# Patient Record
Sex: FEMALE | Race: WHITE | NOT HISPANIC OR LATINO | Employment: OTHER | RURAL
[De-identification: names, ages, dates, MRNs, and addresses within clinical notes are randomized per-mention and may not be internally consistent; named-entity substitution may affect disease eponyms.]

---

## 2021-06-24 ENCOUNTER — OFFICE VISIT (OUTPATIENT)
Dept: FAMILY MEDICINE CLINIC | Facility: CLINIC | Age: 66
End: 2021-06-24

## 2021-06-24 VITALS
HEIGHT: 67 IN | OXYGEN SATURATION: 98 % | SYSTOLIC BLOOD PRESSURE: 128 MMHG | WEIGHT: 153 LBS | HEART RATE: 129 BPM | TEMPERATURE: 97.2 F | BODY MASS INDEX: 24.01 KG/M2 | DIASTOLIC BLOOD PRESSURE: 78 MMHG | RESPIRATION RATE: 18 BRPM

## 2021-06-24 DIAGNOSIS — R53.83 FATIGUE, UNSPECIFIED TYPE: ICD-10-CM

## 2021-06-24 DIAGNOSIS — M79.671 RIGHT FOOT PAIN: ICD-10-CM

## 2021-06-24 DIAGNOSIS — J30.1 NON-SEASONAL ALLERGIC RHINITIS DUE TO POLLEN: Primary | ICD-10-CM

## 2021-06-24 DIAGNOSIS — Z13.220 SCREENING FOR HYPERLIPIDEMIA: ICD-10-CM

## 2021-06-24 DIAGNOSIS — K21.9 GASTROESOPHAGEAL REFLUX DISEASE WITHOUT ESOPHAGITIS: ICD-10-CM

## 2021-06-24 PROBLEM — F51.04 CHRONIC INSOMNIA: Status: ACTIVE | Noted: 2018-03-08

## 2021-06-24 PROCEDURE — 99202 OFFICE O/P NEW SF 15 MIN: CPT | Performed by: FAMILY MEDICINE

## 2021-06-24 RX ORDER — SODIUM PHOSPHATE,MONO-DIBASIC 19G-7G/118
1 ENEMA (ML) RECTAL DAILY
COMMUNITY
End: 2022-07-01

## 2021-06-24 RX ORDER — CETIRIZINE HYDROCHLORIDE 10 MG/1
1 CAPSULE, LIQUID FILLED ORAL DAILY
COMMUNITY

## 2021-06-24 RX ORDER — TRAZODONE HYDROCHLORIDE 50 MG/1
50 TABLET ORAL NIGHTLY PRN
COMMUNITY
End: 2022-07-01

## 2021-06-24 RX ORDER — OMEPRAZOLE 20 MG/1
1 CAPSULE, DELAYED RELEASE ORAL
COMMUNITY
End: 2021-07-08

## 2021-06-24 NOTE — PROGRESS NOTES
Subjective   Ana Sandoval is a 66 y.o. female.     Chief Complaint   Patient presents with   • Foot Pain     right foot    • Heartburn   • Allergies       Foot Injury   The incident occurred more than 1 week ago. There was no injury mechanism. The pain is present in the right foot. The quality of the pain is described as aching. The pain is moderate. The pain has been fluctuating since onset. She reports no foreign bodies present.   Heartburn  She reports no abdominal pain, no chest pain, no coughing or no nausea. This is a chronic problem. She has tried nothing for the symptoms. The treatment provided no relief.      Heart burn is very occasional and secondary to spicy or greasy foot.     Right foot: tender laterally. Hurts on occasion. Nothing makes it better. Bothers her if it hits something.     I personally reviewed and updated the patient's allergies, medications, problem list, and past medical, surgical, social, and family history. I have reviewed and confirmed the accuracy of the History of Present Illness and Review of Symptoms as documented by the MA/LPN/RN. May Encinas MD    Allergies:  Allergies   Allergen Reactions   • Penicillins Rash and Hives   • Tetracycline Hives   • Erythromycin Rash       Social History:  Social History     Socioeconomic History   • Marital status:      Spouse name: Not on file   • Number of children: Not on file   • Years of education: Not on file   • Highest education level: Not on file       Family History:  Family History   Problem Relation Age of Onset   • Heart disease Father    • Kidney disease Father    • Heart disease Mother        Past Medical History :  Patient Active Problem List   Diagnosis   • Acne rosacea   • Allergic rhinitis   • Chronic insomnia   • Menopause   • Vitamin D deficiency   • Right foot pain   • Gastroesophageal reflux disease without esophagitis       Medication List:    Current Outpatient Medications:   •  Calcium-Vitamin D 600-200  "MG-UNIT per tablet, Take  by mouth., Disp: , Rfl:   •  Cetirizine HCl (ZyrTEC Allergy) 10 MG capsule, Take 1 capsule by mouth Daily., Disp: , Rfl:   •  diphenhydrAMINE HCl (BENADRYL ALLERGY PO), Take  by mouth., Disp: , Rfl:   •  glucosamine-chondroitin 500-400 MG capsule capsule, Take 1 capsule by mouth Daily., Disp: , Rfl:   •  omeprazole (priLOSEC) 20 MG capsule, Take 1 tablet by mouth., Disp: , Rfl:   •  traZODone (DESYREL) 50 MG tablet, Take 50 mg by mouth At Night As Needed for Sleep., Disp: , Rfl:     Past Surgical History:  Past Surgical History:   Procedure Laterality Date   • CHOLECYSTECTOMY  2003   • FINGER SURGERY  2019    nodule on left pointer finger       Review of Systems:  Review of Systems   Constitutional: Negative for activity change and fever.   HENT: Negative for ear pain, rhinorrhea, sinus pressure and voice change.    Eyes: Negative for visual disturbance.   Respiratory: Negative for cough and shortness of breath.    Cardiovascular: Negative for chest pain.   Gastrointestinal: Negative for abdominal pain, diarrhea, nausea and vomiting.   Endocrine: Negative for cold intolerance and heat intolerance.   Genitourinary: Negative for frequency and urgency.   Musculoskeletal: Negative for arthralgias.   Skin: Negative for rash.   Neurological: Negative for syncope.   Hematological: Does not bruise/bleed easily.   Psychiatric/Behavioral: Negative for depressed mood. The patient is not nervous/anxious.        Physical Exam:  Vital Signs:  Vital Signs:   /78 (BP Location: Right arm, Patient Position: Sitting, Cuff Size: Adult)   Pulse (!) 129   Temp 97.2 °F (36.2 °C)   Resp 18   Ht 170.2 cm (67\")   Wt 69.4 kg (153 lb)   SpO2 98%   BMI 23.96 kg/m²     Result Review :                Physical Exam  Vitals and nursing note reviewed.   Constitutional:       General: She is not in acute distress.     Appearance: She is well-developed. She is not diaphoretic.   HENT:      Head: Normocephalic and " atraumatic.      Right Ear: External ear normal.      Left Ear: External ear normal.      Nose: Nose normal.      Mouth/Throat:      Pharynx: No oropharyngeal exudate.   Eyes:      General: No scleral icterus.        Right eye: No discharge.         Left eye: No discharge.      Conjunctiva/sclera: Conjunctivae normal.      Pupils: Pupils are equal, round, and reactive to light.   Neck:      Thyroid: No thyromegaly.      Trachea: No tracheal deviation.   Cardiovascular:      Rate and Rhythm: Normal rate and regular rhythm.      Heart sounds: Normal heart sounds. No murmur heard.   No friction rub. No gallop.    Pulmonary:      Effort: Pulmonary effort is normal. No respiratory distress.      Breath sounds: Normal breath sounds. No stridor. No wheezing or rales.   Abdominal:      General: Bowel sounds are normal. There is no distension.      Palpations: Abdomen is soft. There is no mass.      Tenderness: There is no abdominal tenderness. There is no guarding or rebound.   Musculoskeletal:         General: No tenderness or deformity. Normal range of motion.      Cervical back: Normal range of motion and neck supple.      Right foot: Normal. Normal range of motion. No tenderness.   Lymphadenopathy:      Cervical: No cervical adenopathy.   Skin:     General: Skin is warm and dry.      Capillary Refill: Capillary refill takes less than 2 seconds.      Coloration: Skin is not pale.      Findings: No erythema or rash.   Neurological:      Mental Status: She is alert and oriented to person, place, and time.      Cranial Nerves: No cranial nerve deficit.      Sensory: No sensory deficit.      Motor: No tremor, atrophy or abnormal muscle tone.      Coordination: Coordination normal.      Gait: Gait normal.      Deep Tendon Reflexes: Reflexes are normal and symmetric. Reflexes normal.   Psychiatric:         Behavior: Behavior normal.         Thought Content: Thought content normal.         Cognition and Memory: Memory is not  impaired. She does not exhibit impaired recent memory or impaired remote memory.         Judgment: Judgment normal.         Assessment and Plan:  Problems Addressed this Visit        Allergies and Adverse Reactions    Allergic rhinitis - Primary       Gastrointestinal Abdominal     Gastroesophageal reflux disease without esophagitis     Rare use of PPI. She has it if she needs it.          Relevant Medications    omeprazole (priLOSEC) 20 MG capsule       Musculoskeletal and Injuries    Right foot pain     Exam negative. She says it is getting better           Other Visit Diagnoses     Fatigue, unspecified type        Relevant Orders    CBC & Differential    Comprehensive Metabolic Panel    TSH    Screening for hyperlipidemia        Relevant Orders    Lipid Panel With / Chol / HDL Ratio      Diagnoses       Codes Comments    Non-seasonal allergic rhinitis due to pollen    -  Primary ICD-10-CM: J30.1  ICD-9-CM: 477.0     Right foot pain     ICD-10-CM: M79.671  ICD-9-CM: 729.5     Gastroesophageal reflux disease without esophagitis     ICD-10-CM: K21.9  ICD-9-CM: 530.81     Fatigue, unspecified type     ICD-10-CM: R53.83  ICD-9-CM: 780.79     Screening for hyperlipidemia     ICD-10-CM: Z13.220  ICD-9-CM: V77.91            An After Visit Summary and PPPS were given to the patient.         I wore protective equipment throughout this patient encounter to include mask. Hand hygiene was performed before donning protective equipment and after removal when leaving the room.

## 2021-06-29 PROBLEM — Z12.11 SCREENING FOR COLON CANCER: Status: ACTIVE | Noted: 2021-06-29

## 2021-07-02 LAB
ALBUMIN SERPL-MCNC: 4.5 G/DL (ref 3.8–4.8)
ALBUMIN/GLOB SERPL: 1.7 {RATIO} (ref 1.2–2.2)
ALP SERPL-CCNC: 88 IU/L (ref 48–121)
ALT SERPL-CCNC: 20 IU/L (ref 0–32)
AST SERPL-CCNC: 24 IU/L (ref 0–40)
BASOPHILS # BLD AUTO: 0 X10E3/UL (ref 0–0.2)
BASOPHILS NFR BLD AUTO: 1 %
BILIRUB SERPL-MCNC: 0.3 MG/DL (ref 0–1.2)
BUN SERPL-MCNC: 15 MG/DL (ref 8–27)
BUN/CREAT SERPL: 15 (ref 12–28)
CALCIUM SERPL-MCNC: 9.4 MG/DL (ref 8.7–10.3)
CHLORIDE SERPL-SCNC: 107 MMOL/L (ref 96–106)
CHOLEST SERPL-MCNC: 202 MG/DL (ref 100–199)
CHOLEST/HDLC SERPL: 2.4 RATIO (ref 0–4.4)
CO2 SERPL-SCNC: 21 MMOL/L (ref 20–29)
CREAT SERPL-MCNC: 1.02 MG/DL (ref 0.57–1)
EOSINOPHIL # BLD AUTO: 0 X10E3/UL (ref 0–0.4)
EOSINOPHIL NFR BLD AUTO: 1 %
ERYTHROCYTE [DISTWIDTH] IN BLOOD BY AUTOMATED COUNT: 12.9 % (ref 11.7–15.4)
GLOBULIN SER CALC-MCNC: 2.6 G/DL (ref 1.5–4.5)
GLUCOSE SERPL-MCNC: 96 MG/DL (ref 65–99)
HCT VFR BLD AUTO: 44.6 % (ref 34–46.6)
HDLC SERPL-MCNC: 83 MG/DL
HGB BLD-MCNC: 15.2 G/DL (ref 11.1–15.9)
IMM GRANULOCYTES # BLD AUTO: 0 X10E3/UL (ref 0–0.1)
IMM GRANULOCYTES NFR BLD AUTO: 0 %
LDLC SERPL CALC-MCNC: 102 MG/DL (ref 0–99)
LYMPHOCYTES # BLD AUTO: 2.4 X10E3/UL (ref 0.7–3.1)
LYMPHOCYTES NFR BLD AUTO: 37 %
MCH RBC QN AUTO: 29.1 PG (ref 26.6–33)
MCHC RBC AUTO-ENTMCNC: 34.1 G/DL (ref 31.5–35.7)
MCV RBC AUTO: 85 FL (ref 79–97)
MONOCYTES # BLD AUTO: 0.4 X10E3/UL (ref 0.1–0.9)
MONOCYTES NFR BLD AUTO: 7 %
NEUTROPHILS # BLD AUTO: 3.5 X10E3/UL (ref 1.4–7)
NEUTROPHILS NFR BLD AUTO: 54 %
PLATELET # BLD AUTO: 327 X10E3/UL (ref 150–450)
POTASSIUM SERPL-SCNC: 4.5 MMOL/L (ref 3.5–5.2)
PROT SERPL-MCNC: 7.1 G/DL (ref 6–8.5)
RBC # BLD AUTO: 5.22 X10E6/UL (ref 3.77–5.28)
SODIUM SERPL-SCNC: 144 MMOL/L (ref 134–144)
TRIGL SERPL-MCNC: 100 MG/DL (ref 0–149)
TSH SERPL DL<=0.005 MIU/L-ACNC: 1.07 UIU/ML (ref 0.45–4.5)
VLDLC SERPL CALC-MCNC: 17 MG/DL (ref 5–40)
WBC # BLD AUTO: 6.4 X10E3/UL (ref 3.4–10.8)

## 2021-07-08 ENCOUNTER — OFFICE VISIT (OUTPATIENT)
Dept: FAMILY MEDICINE CLINIC | Facility: CLINIC | Age: 66
End: 2021-07-08

## 2021-07-08 VITALS
BODY MASS INDEX: 23.98 KG/M2 | HEIGHT: 67 IN | HEART RATE: 112 BPM | RESPIRATION RATE: 18 BRPM | DIASTOLIC BLOOD PRESSURE: 70 MMHG | TEMPERATURE: 97.8 F | OXYGEN SATURATION: 99 % | WEIGHT: 152.8 LBS | SYSTOLIC BLOOD PRESSURE: 124 MMHG

## 2021-07-08 DIAGNOSIS — Z12.11 SCREENING FOR COLON CANCER: Primary | ICD-10-CM

## 2021-07-08 DIAGNOSIS — M79.671 RIGHT FOOT PAIN: ICD-10-CM

## 2021-07-08 DIAGNOSIS — Z00.00 WELCOME TO MEDICARE PREVENTIVE VISIT: ICD-10-CM

## 2021-07-08 DIAGNOSIS — N95.8 OTHER SPECIFIED MENOPAUSAL AND PERIMENOPAUSAL DISORDERS: ICD-10-CM

## 2021-07-08 DIAGNOSIS — Z12.31 SCREENING MAMMOGRAM, ENCOUNTER FOR: ICD-10-CM

## 2021-07-08 PROCEDURE — 99213 OFFICE O/P EST LOW 20 MIN: CPT | Performed by: FAMILY MEDICINE

## 2021-07-08 RX ORDER — FAMOTIDINE 10 MG
10 TABLET ORAL NIGHTLY PRN
COMMUNITY

## 2021-07-08 NOTE — PROGRESS NOTES
The ABCs of the Annual Wellness Visit  Welcome to Medicare Visit    Chief Complaint   Patient presents with   • Welcome To Medicare       Subjective   History of Present Illness:  Ana Sandoval is a 66 y.o. female who presents for a  Welcome to Medicare Visit.    HEALTH RISK ASSESSMENT    Recent Hospitalizations:  No hospitalization(s) within the last year.    Current Medical Providers:  Patient Care Team:  May Encinas MD as PCP - General (Family Medicine)    Smoking Status:  Social History     Tobacco Use   Smoking Status Not on file       Alcohol Consumption:  Social History     Substance and Sexual Activity   Alcohol Use None       Depression Screen:   PHQ-2/PHQ-9 Depression Screening 7/8/2021   Little interest or pleasure in doing things 0   Feeling down, depressed, or hopeless 0   Trouble falling or staying asleep, or sleeping too much 1   Feeling tired or having little energy 1   Poor appetite or overeating 0   Feeling bad about yourself - or that you are a failure or have let yourself or your family down 1   Trouble concentrating on things, such as reading the newspaper or watching television 0   Moving or speaking so slowly that other people could have noticed. Or the opposite - being so fidgety or restless that you have been moving around a lot more than usual 0   Thoughts that you would be better off dead, or of hurting yourself in some way 0   Total Score 3   If you checked off any problems, how difficult have these problems made it for you to do your work, take care of things at home, or get along with other people? Not difficult at all       Fall Risk Screen:  STEADI Fall Risk Assessment was completed, and patient is at LOW risk for falls.Assessment completed on:7/8/2021    Health Habits and Functional and Cognitive Screening:  Functional & Cognitive Status 7/8/2021   Do you have difficulty preparing food and eating? No   Do you have difficulty bathing yourself, getting dressed or grooming  yourself? No   Do you have difficulty using the toilet? No   Do you have difficulty moving around from place to place? No   Do you have trouble with steps or getting out of a bed or a chair? No   Current Diet Well Balanced Diet   Dental Exam Up to date   Eye Exam Not up to date   Exercise (times per week) 2 times per week   Current Exercises Include House Cleaning   Do you need help using the phone?  No   Are you deaf or do you have serious difficulty hearing?  No   Do you need help with transportation? No   Do you need help shopping? No   Do you need help preparing meals?  No   Do you need help with housework?  No   Do you need help with laundry? No   Do you need help taking your medications? No   Do you need help managing money? No   Do you ever drive or ride in a car without wearing a seat belt? No   Have you felt unusual stress, anger or loneliness in the last month? No   Who do you live with? Spouse   If you need help, do you have trouble finding someone available to you? No   Have you been bothered in the last four weeks by sexual problems? No   Do you have difficulty concentrating, remembering or making decisions? No         Does the patient have evidence of cognitive impairment? No    Asprin use counseling:Does not need ASA (and currently is not on it)    Visual Acuity:    No exam data present    Age-appropriate Screening Schedule:  Refer to the list below for future screening recommendations based on patient's age, sex and/or medical conditions. Orders for these recommended tests are listed in the plan section. The patient has been provided with a written plan.    Health Maintenance   Topic Date Due   • DXA SCAN  Never done   • ZOSTER VACCINE (2 of 3) 09/20/2017   • TDAP/TD VACCINES (4 - Td or Tdap) 08/05/2021 (Originally 2/7/2021)   • INFLUENZA VACCINE  08/01/2021   • MAMMOGRAM  06/15/2022   • PAP SMEAR  03/17/2023          HPI    The following portions of the patient's history were reviewed and updated as  appropriate: allergies, current medications, past family history, past medical history, past social history, past surgical history and problem list.    Outpatient Medications Prior to Visit   Medication Sig Dispense Refill   • Calcium-Vitamin D 600-200 MG-UNIT per tablet Take  by mouth.     • Cetirizine HCl (ZyrTEC Allergy) 10 MG capsule Take 1 capsule by mouth Daily.     • diphenhydrAMINE HCl (BENADRYL ALLERGY PO) Take  by mouth.     • famotidine (PEPCID) 10 MG tablet Take 10 mg by mouth At Night As Needed for Heartburn.     • traZODone (DESYREL) 50 MG tablet Take 50 mg by mouth At Night As Needed for Sleep.     • glucosamine-chondroitin 500-400 MG capsule capsule Take 1 capsule by mouth Daily.     • omeprazole (priLOSEC) 20 MG capsule Take 1 tablet by mouth.       No facility-administered medications prior to visit.       Patient Active Problem List   Diagnosis   • Acne rosacea   • Allergic rhinitis   • Chronic insomnia   • Menopause   • Vitamin D deficiency   • Right foot pain   • Gastroesophageal reflux disease without esophagitis   • Screening for colon cancer   • Welcome to Medicare preventive visit   • Screening mammogram, encounter for       Advanced Care Planning:  ACP discussion was held with the patient during this visit. Patient has an advance directive (not in EMR), copy requested.      Review of Systems   Constitutional: Negative for activity change and fever.   HENT: Negative for ear pain, rhinorrhea, sinus pressure and voice change.    Eyes: Negative for visual disturbance.   Respiratory: Negative for cough and shortness of breath.    Cardiovascular: Negative for chest pain.   Gastrointestinal: Negative for abdominal pain, diarrhea, nausea and vomiting.   Endocrine: Negative for cold intolerance and heat intolerance.   Genitourinary: Negative for frequency and urgency.   Musculoskeletal: Negative for arthralgias.   Skin: Negative for rash.   Neurological: Negative for syncope.   Hematological: Does  "not bruise/bleed easily.   Psychiatric/Behavioral: Negative for depressed mood. The patient is not nervous/anxious.      Compared to one year ago, the patient feels her physical health is the same.  Compared to one year ago, the patient feels her mental health is the same.    Reviewed chart for potential of high risk medication in the elderly: yes  Reviewed chart for potential of harmful drug interactions in the elderly:yes    Objective         Vitals:    07/08/21 0844   BP: 124/70   Pulse: 112   Resp: 18   Temp: 97.8 °F (36.6 °C)   SpO2: 99%   Weight: 69.3 kg (152 lb 12.8 oz)   Height: 170.2 cm (67\")   PainSc: 0-No pain       Body mass index is 23.93 kg/m².  Discussed the patient's BMI with her. The BMI is in the acceptable range.    Physical Exam  Vitals reviewed. Exam conducted with a chaperone present.   Constitutional:       General: She is not in acute distress.     Appearance: She is well-developed. She is not diaphoretic.   HENT:      Head: Normocephalic and atraumatic.      Right Ear: Hearing and external ear normal.      Left Ear: Hearing and external ear normal.      Nose: Nose normal.      Mouth/Throat:      Pharynx: No oropharyngeal exudate.   Eyes:      General: No scleral icterus.        Right eye: No discharge.         Left eye: No discharge.      Conjunctiva/sclera: Conjunctivae normal.      Pupils: Pupils are equal, round, and reactive to light.   Neck:      Thyroid: No thyromegaly.      Trachea: No tracheal deviation.   Cardiovascular:      Rate and Rhythm: Normal rate and regular rhythm.      Heart sounds: Normal heart sounds. No murmur heard.   No friction rub. No gallop.    Pulmonary:      Effort: Pulmonary effort is normal. No respiratory distress.      Breath sounds: Normal breath sounds. No wheezing or rales.   Chest:      Breasts:         Right: No inverted nipple, mass, nipple discharge, skin change or tenderness.         Left: No inverted nipple, mass, nipple discharge, skin change or " tenderness.   Abdominal:      General: Bowel sounds are normal. There is no distension.      Palpations: Abdomen is soft. There is no mass.      Tenderness: There is no abdominal tenderness. There is no guarding or rebound.      Hernia: No hernia is present.   Genitourinary:     Labia:         Right: No rash or lesion.         Left: No rash or lesion.       Vagina: Normal.      Adnexa:         Right: No mass.          Left: No mass.        Rectum: Normal.   Musculoskeletal:         General: No tenderness or deformity. Normal range of motion.      Cervical back: Normal range of motion and neck supple.   Lymphadenopathy:      Cervical: No cervical adenopathy.   Skin:     General: Skin is warm and dry.      Capillary Refill: Capillary refill takes less than 2 seconds.      Findings: No erythema or rash.   Neurological:      Mental Status: She is alert and oriented to person, place, and time.      Cranial Nerves: No cranial nerve deficit.      Sensory: No sensory deficit.      Motor: No abnormal muscle tone.      Coordination: Coordination normal.      Deep Tendon Reflexes: Reflexes normal.   Psychiatric:         Behavior: Behavior normal.         Lab Results   Component Value Date    GLU 96 07/01/2021    CHLPL 202 (H) 07/01/2021    TRIG 100 07/01/2021    HDL 83 07/01/2021     (H) 07/01/2021    VLDL 17 07/01/2021          ECG 12 Lead    Date/Time: 7/15/2021 11:50 PM  Performed by: May Encinas MD  Authorized by: May Encinas MD   Comparison: not compared with previous ECG   Rhythm: sinus rhythm  Rate: normal  Conduction: conduction normal  ST Segments: ST segments normal  T Waves: T waves normal    Clinical impression: normal ECG            Assessment/Plan   Medicare Risks and Personalized Health Plan  CMS Preventative Services Quick Reference  Advance Directive Discussion  Breast Cancer/Mammogram Screening    The above risks/problems have been discussed with the patient.  Pertinent information has been  shared with the patient in the After Visit Summary.  Follow up plans and orders are seen below in the Assessment/Plan Section.    Diagnoses and all orders for this visit:    1. Screening for colon cancer (Primary)    2. Welcome to Medicare preventive visit    3. Screening mammogram, encounter for  -     Mammo Screening Digital Tomosynthesis Bilateral With CAD; Future    4. Other specified menopausal and perimenopausal disorders  -     DEXA Bone Density Axial; Future    5. Right foot pain  Assessment & Plan:  With a burn. Now and then laterally. She is going to try different shoes.         Medical Problems           Follow Up:  No follow-ups on file.     An After Visit Summary and PPPS were given to the patient.    Discussed wearing sunscreen, seatbelts. Avoidance or caution with alcohol. Safe sex practices discussed. Discussed screening as appropriate for age.        I wore protective equipment throughout this patient encounter to include mask. Hand hygiene was performed before donning protective equipment and after removal when leaving the room.

## 2021-07-19 ENCOUNTER — APPOINTMENT (OUTPATIENT)
Dept: BONE DENSITY | Facility: HOSPITAL | Age: 66
End: 2021-07-19

## 2021-07-19 ENCOUNTER — APPOINTMENT (OUTPATIENT)
Dept: MAMMOGRAPHY | Facility: HOSPITAL | Age: 66
End: 2021-07-19

## 2021-07-23 ENCOUNTER — HOSPITAL ENCOUNTER (OUTPATIENT)
Dept: BONE DENSITY | Facility: HOSPITAL | Age: 66
Discharge: HOME | End: 2021-07-23

## 2021-07-23 ENCOUNTER — HOSPITAL ENCOUNTER (OUTPATIENT)
Dept: MAMMOGRAPHY | Facility: HOSPITAL | Age: 66
Discharge: HOME | End: 2021-07-23

## 2021-07-23 DIAGNOSIS — N95.8 OTHER SPECIFIED MENOPAUSAL AND PERIMENOPAUSAL DISORDERS: ICD-10-CM

## 2021-07-23 DIAGNOSIS — Z12.31 SCREENING MAMMOGRAM, ENCOUNTER FOR: ICD-10-CM

## 2021-07-23 PROCEDURE — 77080 DXA BONE DENSITY AXIAL: CPT

## 2021-07-23 PROCEDURE — 77067 SCR MAMMO BI INCL CAD: CPT

## 2021-07-23 PROCEDURE — 77063 BREAST TOMOSYNTHESIS BI: CPT

## 2021-08-06 ENCOUNTER — TELEPHONE (OUTPATIENT)
Dept: FAMILY MEDICINE CLINIC | Facility: CLINIC | Age: 66
End: 2021-08-06

## 2021-08-06 NOTE — TELEPHONE ENCOUNTER
Called patient and did her breast cancer risk assessment   5 year risk: 1.7%    Demo: 2.0%   Lifetime risk:6.3%      Demo: 7.8%

## 2022-04-15 ENCOUNTER — TELEPHONE (OUTPATIENT)
Dept: FAMILY MEDICINE CLINIC | Facility: CLINIC | Age: 67
End: 2022-04-15

## 2022-04-15 NOTE — TELEPHONE ENCOUNTER
I have not heard or seen convincing information on getting the 2nd booster yet. I am also concerned with the vaccines not yet made for variants. If [place] requires the second booster (so far they are not) then yes get it.

## 2022-04-15 NOTE — TELEPHONE ENCOUNTER
----- Message from Ana Sandoval sent at 4/15/2022 10:00 AM EDT -----  Regardinnd covid booster question  We are planning to travel to [place] in October- we had our first booster in November- should we get a second booster when our six months are up (May) or closer to our trip?     Thanks,  Ana

## 2022-07-01 ENCOUNTER — OFFICE VISIT (OUTPATIENT)
Dept: FAMILY MEDICINE CLINIC | Facility: CLINIC | Age: 67
End: 2022-07-01

## 2022-07-01 VITALS
WEIGHT: 160.4 LBS | HEIGHT: 67 IN | RESPIRATION RATE: 18 BRPM | BODY MASS INDEX: 25.18 KG/M2 | SYSTOLIC BLOOD PRESSURE: 134 MMHG | HEART RATE: 112 BPM | OXYGEN SATURATION: 95 % | DIASTOLIC BLOOD PRESSURE: 82 MMHG | TEMPERATURE: 97.8 F

## 2022-07-01 DIAGNOSIS — F41.9 ANXIETY: ICD-10-CM

## 2022-07-01 DIAGNOSIS — E78.2 MIXED HYPERLIPIDEMIA: ICD-10-CM

## 2022-07-01 DIAGNOSIS — R53.83 MALAISE AND FATIGUE: ICD-10-CM

## 2022-07-01 DIAGNOSIS — R53.81 MALAISE AND FATIGUE: ICD-10-CM

## 2022-07-01 DIAGNOSIS — Z00.00 MEDICARE ANNUAL WELLNESS VISIT, INITIAL: Primary | ICD-10-CM

## 2022-07-01 DIAGNOSIS — Z12.31 SCREENING MAMMOGRAM, ENCOUNTER FOR: ICD-10-CM

## 2022-07-01 DIAGNOSIS — S30.861A TICK BITE OF ABDOMINAL WALL, INITIAL ENCOUNTER: ICD-10-CM

## 2022-07-01 DIAGNOSIS — Z11.59 NEED FOR HEPATITIS C SCREENING TEST: ICD-10-CM

## 2022-07-01 DIAGNOSIS — Z13.220 SCREENING FOR HYPERLIPIDEMIA: ICD-10-CM
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
## 2022-07-01 PROCEDURE — 99212 OFFICE O/P EST SF 10 MIN: CPT | Performed by: FAMILY MEDICINE

## 2022-07-01 PROCEDURE — 1126F AMNT PAIN NOTED NONE PRSNT: CPT | Performed by: FAMILY MEDICINE

## 2022-07-01 PROCEDURE — G0438 PPPS, INITIAL VISIT: HCPCS | Performed by: FAMILY MEDICINE

## 2022-07-01 PROCEDURE — 1170F FXNL STATUS ASSESSED: CPT | Performed by: FAMILY MEDICINE

## 2022-07-01 PROCEDURE — 1159F MED LIST DOCD IN RCRD: CPT | Performed by: FAMILY MEDICINE

## 2022-07-02 NOTE — ASSESSMENT & PLAN NOTE
Her anxiety is bad secondary to worry over her father.   She declines medication  Discussed diet change, exercise, yoga

## 2022-07-09 LAB
ALBUMIN SERPL-MCNC: 4.7 G/DL (ref 3.8–4.8)
ALBUMIN/GLOB SERPL: 1.9 {RATIO} (ref 1.2–2.2)
ALP SERPL-CCNC: 100 IU/L (ref 44–121)
ALT SERPL-CCNC: 21 IU/L (ref 0–32)
AST SERPL-CCNC: 24 IU/L (ref 0–40)
BASOPHILS # BLD AUTO: 0 X10E3/UL (ref 0–0.2)
BASOPHILS NFR BLD AUTO: 1 %
BILIRUB SERPL-MCNC: 0.5 MG/DL (ref 0–1.2)
BUN SERPL-MCNC: 15 MG/DL (ref 8–27)
BUN/CREAT SERPL: 16 (ref 12–28)
CALCIUM SERPL-MCNC: 9.6 MG/DL (ref 8.7–10.3)
CHLORIDE SERPL-SCNC: 104 MMOL/L (ref 96–106)
CHOLEST SERPL-MCNC: 230 MG/DL (ref 100–199)
CHOLEST/HDLC SERPL: 3 RATIO (ref 0–4.4)
CO2 SERPL-SCNC: 26 MMOL/L (ref 20–29)
CREAT SERPL-MCNC: 0.93 MG/DL (ref 0.57–1)
EGFRCR SERPLBLD CKD-EPI 2021: 68 ML/MIN/1.73
EOSINOPHIL # BLD AUTO: 0.1 X10E3/UL (ref 0–0.4)
EOSINOPHIL NFR BLD AUTO: 1 %
ERYTHROCYTE [DISTWIDTH] IN BLOOD BY AUTOMATED COUNT: 13.1 % (ref 11.7–15.4)
GLOBULIN SER CALC-MCNC: 2.5 G/DL (ref 1.5–4.5)
GLUCOSE SERPL-MCNC: 94 MG/DL (ref 65–99)
HCT VFR BLD AUTO: 47 % (ref 34–46.6)
HDLC SERPL-MCNC: 76 MG/DL
HGB BLD-MCNC: 15.5 G/DL (ref 11.1–15.9)
IMM GRANULOCYTES # BLD AUTO: 0 X10E3/UL (ref 0–0.1)
IMM GRANULOCYTES NFR BLD AUTO: 0 %
LDLC SERPL CALC-MCNC: 130 MG/DL (ref 0–99)
LYMPHOCYTES # BLD AUTO: 2.4 X10E3/UL (ref 0.7–3.1)
LYMPHOCYTES NFR BLD AUTO: 36 %
MCH RBC QN AUTO: 28.2 PG (ref 26.6–33)
MCHC RBC AUTO-ENTMCNC: 33 G/DL (ref 31.5–35.7)
MCV RBC AUTO: 86 FL (ref 79–97)
MONOCYTES # BLD AUTO: 0.4 X10E3/UL (ref 0.1–0.9)
MONOCYTES NFR BLD AUTO: 6 %
NEUTROPHILS # BLD AUTO: 3.7 X10E3/UL (ref 1.4–7)
NEUTROPHILS NFR BLD AUTO: 56 %
PLATELET # BLD AUTO: 359 X10E3/UL (ref 150–450)
POTASSIUM SERPL-SCNC: 4.5 MMOL/L (ref 3.5–5.2)
PROT SERPL-MCNC: 7.2 G/DL (ref 6–8.5)
RBC # BLD AUTO: 5.49 X10E6/UL (ref 3.77–5.28)
SODIUM SERPL-SCNC: 144 MMOL/L (ref 134–144)
TRIGL SERPL-MCNC: 137 MG/DL (ref 0–149)
TSH SERPL DL<=0.005 MIU/L-ACNC: 1.28 UIU/ML (ref 0.45–4.5)
VLDLC SERPL CALC-MCNC: 24 MG/DL (ref 5–40)
WBC # BLD AUTO: 6.6 X10E3/UL (ref 3.4–10.8)

## 2022-08-01 ENCOUNTER — HOSPITAL ENCOUNTER (OUTPATIENT)
Dept: MAMMOGRAPHY | Facility: HOSPITAL | Age: 67
Discharge: HOME | End: 2022-08-01
Admitting: FAMILY MEDICINE

## 2022-08-01 DIAGNOSIS — Z12.31 SCREENING MAMMOGRAM, ENCOUNTER FOR: ICD-10-CM

## 2022-08-01 PROCEDURE — 77063 BREAST TOMOSYNTHESIS BI: CPT

## 2022-08-01 PROCEDURE — 77067 SCR MAMMO BI INCL CAD: CPT

## 2022-08-03 ENCOUNTER — TELEPHONE (OUTPATIENT)
Dept: FAMILY MEDICINE CLINIC | Facility: CLINIC | Age: 67
End: 2022-08-03

## 2022-08-03 DIAGNOSIS — R92.2 DENSE BREAST: Primary | ICD-10-CM

## 2022-08-08 ENCOUNTER — TELEPHONE (OUTPATIENT)
Dept: ONCOLOGY | Facility: CLINIC | Age: 67
End: 2022-08-08

## 2022-09-01 NOTE — PROGRESS NOTES
Hematology/Oncology Outpatient Consultation    Patient name: Ana Sandoval  : 1955  MRN: [id number]  Primary Care Physician: May Encinas MD  Referring Physician: May Encinas MD  Reason For Consult:     Chief Complaint   Patient presents with   • Appointment     Dense Breast       History of Present Illness:      This is a 67-year-old female who is here today due to personal history of having dense breast tissue.  Patient has no prior history of breast pathologies.  She is never had any biopsies in the past.  Her last mammogram was completed on 2022 and it did show that she has significantly dense breast tissue which can affect mammographic sensitivity.  She has not family history of breast cancer.    She has a family history of prostate cancer in her father at age of 80 and also a paternal cousin in his 80s as well.    Patient is , she is nulliparous    There is no history of HRT.  She was postmenopausal at age of 56.    She does not smoke and does not drink excessive alcohol    No past medical history on file.     HPL    Past Surgical History:   Procedure Laterality Date   • CHOLECYSTECTOMY     • FINGER SURGERY  2019    nodule on left pointer finger         Current Outpatient Medications:   •  cholecalciferol (Vitamin D) 25 MCG (1000 UT) tablet, , Disp: , Rfl:   •  Cyanocobalamin (B-12) 3000 MCG capsule, , Disp: , Rfl:   •  Multiple Vitamins-Minerals (PreserVision AREDS 2+Multi Vit) capsule, , Disp: , Rfl:   •  Omega-3 1000 MG capsule, , Disp: , Rfl:   •  Propylene Glycol (SYSTANE COMPLETE OP), , Disp: , Rfl:   •  Calcium-Vitamin D 600-200 MG-UNIT per tablet, Take  by mouth., Disp: , Rfl:   •  Cetirizine HCl (ZyrTEC Allergy) 10 MG capsule, Take 1 capsule by mouth Daily., Disp: , Rfl:   •  Coenzyme Q10 (COQ10 PO), Take  by mouth., Disp: , Rfl:   •  diphenhydrAMINE HCl (BENADRYL ALLERGY PO), Take  by mouth., Disp: , Rfl:   •  famotidine (PEPCID) 10 MG tablet, Take 10 mg by mouth  At Night As Needed for Heartburn., Disp: , Rfl:     Allergies   Allergen Reactions   • Penicillins Rash and Hives   • Tetracycline Hives   • Erythromycin Rash       Immunization History   Administered Date(s) Administered   • COVID-19 (MODERNA) 1st, 2nd, 3rd Dose Only 04/17/2020, 03/19/2021   • COVID-19 (MODERNA) BOOSTER 11/11/2021   • Flu Vaccine Intradermal Quad 18-64YR 10/11/2018, 10/15/2019   • Flu Vaccine Quad PF >36MO 10/11/2018, 10/15/2019   • Flu Vaccine Split Quad 10/06/2017, 09/22/2020   • FluLaval/Fluzone >6mos 10/06/2017, 09/22/2020   • Fluzone High-Dose 65+yrs 10/13/2021   • Fluzone Quad >6mos (Multi-dose) 10/11/2018, 10/15/2019   • Hepatitis A 08/05/2009, 02/10/2010   • Influenza TIV (IM) 10/01/2012, 10/15/2013, 10/14/2014, 10/01/2015, 10/06/2016   • PPD - Urgent Care Use Only 05/30/2007   • Td 01/01/1992, 10/17/2002   • Tdap 02/07/2011, 07/03/2021   • Typhoid, Unspecified 08/19/2009   • Zostavax 07/26/2017       Family History   Problem Relation Age of Onset   • Heart disease Mother    • Heart disease Father    • Kidney disease Father    • Breast cancer Neg Hx    • Ovarian cancer Neg Hx        Cancer-related family history is negative for Breast cancer and Ovarian cancer.         ROS:    Review of Systems   Constitutional: Negative for chills, fatigue and fever.   HENT: Negative for congestion, drooling, ear discharge, rhinorrhea, sinus pressure and tinnitus.    Eyes: Negative for photophobia, pain and discharge.   Respiratory: Negative for apnea, choking and stridor.    Cardiovascular: Negative for palpitations.   Gastrointestinal: Negative for abdominal distention, abdominal pain and anal bleeding.   Endocrine: Negative for polydipsia and polyphagia.   Genitourinary: Negative for decreased urine volume, flank pain and genital sores.   Musculoskeletal: Negative for gait problem, neck pain and neck stiffness.   Skin: Negative for color change, rash and wound.   Neurological: Negative for tremors,  "seizures, syncope, facial asymmetry and speech difficulty.   Hematological: Negative for adenopathy.   Psychiatric/Behavioral: Negative for agitation, confusion, hallucinations and self-injury. The patient is not hyperactive.        Objective:    Vitals:    09/02/22 0844   BP: 141/85   Pulse: 91   Resp: 18   Temp: 96.9 °F (36.1 °C)   TempSrc: Infrared   Weight: 72.6 kg (160 lb)   Height: 170.2 cm (67\")   PainSc: 0-No pain     Body mass index is 25.06 kg/m².    ECOG  (0) Fully active, able to carry on all predisease performance without restriction    Physical Exam:  Physical Exam  Vitals and nursing note reviewed.   Constitutional:       General: She is not in acute distress.     Appearance: She is not diaphoretic.   HENT:      Head: Normocephalic and atraumatic.   Eyes:      General: No scleral icterus.        Right eye: No discharge.         Left eye: No discharge.      Conjunctiva/sclera: Conjunctivae normal.   Neck:      Thyroid: No thyromegaly.   Cardiovascular:      Rate and Rhythm: Normal rate and regular rhythm.      Heart sounds: Normal heart sounds.     No friction rub. No gallop.   Pulmonary:      Effort: Pulmonary effort is normal. No respiratory distress.      Breath sounds: No stridor. No wheezing.   Abdominal:      General: Bowel sounds are normal.      Palpations: Abdomen is soft. There is no mass.      Tenderness: There is no abdominal tenderness. There is no guarding or rebound.   Musculoskeletal:         General: No tenderness. Normal range of motion.      Cervical back: Normal range of motion and neck supple.   Lymphadenopathy:      Cervical: No cervical adenopathy.   Skin:     General: Skin is warm.      Findings: No erythema or rash.   Neurological:      Mental Status: She is alert and oriented to person, place, and time.      Motor: No abnormal muscle tone.   Psychiatric:         Behavior: Behavior normal.         RECENT LABS  WBC   Date Value Ref Range Status   07/08/2022 6.6 3.4 - 10.8 " x10E3/uL Final     RBC   Date Value Ref Range Status   07/08/2022 5.49 (H) 3.77 - 5.28 x10E6/uL Final     Hemoglobin   Date Value Ref Range Status   07/08/2022 15.5 11.1 - 15.9 g/dL Final     Hematocrit   Date Value Ref Range Status   07/08/2022 47.0 (H) 34.0 - 46.6 % Final     MCV   Date Value Ref Range Status   07/08/2022 86 79 - 97 fL Final     MCH   Date Value Ref Range Status   07/08/2022 28.2 26.6 - 33.0 pg Final     MCHC   Date Value Ref Range Status   07/08/2022 33.0 31.5 - 35.7 g/dL Final     RDW   Date Value Ref Range Status   07/08/2022 13.1 11.7 - 15.4 % Final     Platelets   Date Value Ref Range Status   07/08/2022 359 150 - 450 x10E3/uL Final     Neutrophil Rel %   Date Value Ref Range Status   07/08/2022 56 Not Estab. % Final     Lymphocyte Rel %   Date Value Ref Range Status   07/08/2022 36 Not Estab. % Final     Monocyte Rel %   Date Value Ref Range Status   07/08/2022 6 Not Estab. % Final     Eosinophil Rel %   Date Value Ref Range Status   07/08/2022 1 Not Estab. % Final     Basophil Rel %   Date Value Ref Range Status   07/08/2022 1 Not Estab. % Final     Neutrophils Absolute   Date Value Ref Range Status   07/08/2022 3.7 1.4 - 7.0 x10E3/uL Final     Lymphocytes Absolute   Date Value Ref Range Status   07/08/2022 2.4 0.7 - 3.1 x10E3/uL Final     Monocytes Absolute   Date Value Ref Range Status   07/08/2022 0.4 0.1 - 0.9 x10E3/uL Final     Eosinophils Absolute   Date Value Ref Range Status   07/08/2022 0.1 0.0 - 0.4 x10E3/uL Final     Basophils Absolute   Date Value Ref Range Status   07/08/2022 0.0 0.0 - 0.2 x10E3/uL Final       Lab Results   Component Value Date    GLUCOSE 94 07/08/2022    BUN 15 07/08/2022    CREATININE 0.93 07/08/2022    EGFRIFNONA 58 (L) 07/01/2021    EGFRIFAFRI 67 07/01/2021    BCR 16 07/08/2022    K 4.5 07/08/2022    CO2 26 07/08/2022    CALCIUM 9.6 07/08/2022    PROTENTOTREF 7.2 07/08/2022    ALBUMIN 4.7 07/08/2022    LABIL2 1.9 07/08/2022    AST 24 07/08/2022    ALT 21  07/08/2022         Assessment & Plan   There are no diagnoses linked to this encounter.    1.  Dense breast tissue  2. Princess Elizabeth risk for breast cancer 7.2%, not elevated  3. Family history of prostate cancer in her father and also a paternal cousin both in their 80s    Discussion    I have reviewed her mammogram.  We discussed that increased breast density does  elevate her risk for breast cancer to a moderate risk level.  In addition to increased breast density, she will have to have additional risk factors for breast cancer for breast MRI to be part of her screening protocol.  She does not have any family history of breast cancer or ovarian cancer.  Her Scarer-Regulo was estimated at 7.2% lifetime risk for developing breast cancer .  We also discussed that Princess-Regulo code gave false positive and false negative  Values.      We discussed risk modifications such as limiting alcohol ingestion to one to two drinks per week, avoidance of smoking and avoidance of postmenopausal weight gain.  We discussed breast cancer risks associated with prolonged hormone replacement therapy.    We discussed signs and symptoms for patient to watch out for and notify us should they occur including breast lumps, nipple discharge, skin discoloration, breast pain or any other concerns she may have       We also discussed the role of genetic screening to help to assess her risk.  She has family history of prostate cancer on both her father and also a paternal cousin.  I did discuss that she could be a candidate for genetic testing.  Information was given to her today to review and let me know how she wants to proceed    Otherwise she will follow-up with her PCP for annual clinical breast exams and ordering of her mammograms.      Patient verbalized understanding and is in agreement of the above plan.          Plans    · Annual clinical breast exams and mammograms through her PCP  · Monthly breast self exams encouraged  · Lifestyle  modifications discussed  · Follow-up as needed  · Information was given to her on cancer genetics.  Patient to let me know if she is interested in pursuing genetic testing in the future.      Thank you very much for referring Ms. Sandoval      I spent 45 total minutes, face-to-face, caring for Ana today.  90% of this time involved counseling and/or coordination of care as documented within this note.

## 2022-09-02 ENCOUNTER — CONSULT (OUTPATIENT)
Dept: ONCOLOGY | Facility: CLINIC | Age: 67
End: 2022-09-02

## 2022-09-02 ENCOUNTER — APPOINTMENT (OUTPATIENT)
Dept: LAB | Facility: HOSPITAL | Age: 67
End: 2022-09-02

## 2022-09-02 VITALS
SYSTOLIC BLOOD PRESSURE: 141 MMHG | TEMPERATURE: 96.9 F | RESPIRATION RATE: 18 BRPM | WEIGHT: 160 LBS | BODY MASS INDEX: 25.11 KG/M2 | DIASTOLIC BLOOD PRESSURE: 85 MMHG | HEIGHT: 67 IN | HEART RATE: 91 BPM

## 2022-09-02 DIAGNOSIS — R92.2 DENSE BREAST: Primary | ICD-10-CM

## 2022-09-02 PROCEDURE — 99204 OFFICE O/P NEW MOD 45 MIN: CPT | Performed by: INTERNAL MEDICINE

## 2022-09-02 RX ORDER — MV-MIN/FA/VIT K/LUTEIN/ZEAXANT 200MCG-5MG
CAPSULE ORAL
COMMUNITY
Start: 2022-03-08

## 2022-09-02 RX ORDER — CHLORAL HYDRATE 500 MG
CAPSULE ORAL
COMMUNITY
Start: 2022-07-15

## 2022-09-02 RX ORDER — CHOLECALCIFEROL (VITAMIN D3) 25 MCG
TABLET,CHEWABLE ORAL
COMMUNITY
Start: 2022-07-07

## 2022-09-02 RX ORDER — MELATONIN
COMMUNITY
Start: 2018-01-01

## 2022-09-08 ENCOUNTER — PATIENT ROUNDING (BHMG ONLY) (OUTPATIENT)
Dept: ONCOLOGY | Facility: CLINIC | Age: 67
End: 2022-09-08

## 2022-09-08 NOTE — PROGRESS NOTES
September 8, 2022    Hello, may I speak with Ana Sandoval?    My name is Delisa Cohen      I am  with [facility] [address] [place] [address] [phone].    Before we get started may I verify your date of birth? 1955    I am calling to officially welcome you to our practice and ask about your recent visit. Is this a good time to talk? no    Tell me about your visit with us. What things went well?  A My Chart message was sent to the patient.       We're always looking for ways to make our patients' experiences even better. Do you have recommendations on ways we may improve?  no    Overall were you satisfied with your first visit to our practice? yes       I appreciate you taking the time to speak with me today. Is there anything else I can do for you? no      Thank you, and have a great day.

## 2022-09-15 ENCOUNTER — DOCUMENTATION (OUTPATIENT)
Dept: ONCOLOGY | Facility: CLINIC | Age: 67
End: 2022-09-15

## 2022-09-15 NOTE — PROGRESS NOTES
Distress Screening Follow-up     Diagnosis:   Dense Breast     Comments: OSW sent patient a [employer] message offering support/assistance.      Location of Distress Screening: Medical Oncology      Distress Level: 7 (09/02/2022 ; 0830)     Physical Concerns:      Appearance: N  Bathing/Dressing: N  Breathing: N  Changes in urination: N  Changes in Appearance: N  Constipation: N  Diarrhea: N  Eating: N  Fatigue: Y  Feeling swollen: N  Fevers: N  Getting around: N  Indigestion: N  Loss/change of physical abilities: N  Memory/Concentration: Y  Mouth sores: N  Nausea: N  Nose dry/congested: N  Pain: N  Sexual: N  Skin dry/itchy: N  Sleep: Y  Substance abuse: N  Tingling hands/feet: N     Practical Problems:      : N  Food: N  Housing: N  Insurance/Financial: N  Access to Medications: N  Transportation: N  Work/School: N  Treatment decisions: Y  Taking care of myself: N  Taking care of others: Y    Practical Concerns Comments:      Emotional Concerns:      Fear: N  Feelings of worthlessness/burden: N  Grief/Loss: N  Sadness/Depression: Y  Worry/Anxiety: Y  Loss of Interest/Activities: N  Lonely: Y  Anger: N     Family Concerns:      Dealing with children: N  Relationship with partner/spouse: Y  Relationship with family members: Y  Ability to have children: N  Family health issues: N  Family Concerns Comment: No     Spiritual Concerns:     Sense of Meaning or Purpose: N  Changes in Malissa or Beliefs: Y  Death, Dying, or Afterlife: N  Conflict b/t Beliefs & Cancer Treatments: N  Relationship w/ the Sacred: N  Ritual or Dietary Needs: N     Spiritual/Jewish Comments:

## 2022-10-28 ENCOUNTER — PATIENT MESSAGE (OUTPATIENT)
Dept: FAMILY MEDICINE CLINIC | Facility: CLINIC | Age: 67
End: 2022-10-28

## 2022-10-28 ENCOUNTER — TELEPHONE (OUTPATIENT)
Dept: FAMILY MEDICINE CLINIC | Facility: CLINIC | Age: 67
End: 2022-10-28

## 2022-10-28 NOTE — TELEPHONE ENCOUNTER
----- Message from Ana Sandoval sent at 10/28/2022  7:24 AM EDT -----  Regarding: Covid Positive question  Contact: [phone]  I had a positive Covid result on Monday.  I've not had a fever, mostly a sore throat, runny and stuffy nose so far plus the fatigue. It has gotten better.  I got info from the CDC and would like clarification. I normally take my 96 year old dad to dialysis and don't want to expose him to this.  The CDC states in one place after 5 days you can be around people for a limited time with a mask. In another place it states you should test negative before close contact with high risk individuals.  What is your recommendation and when should I take another Covid test?  tomorrow?  Thanks,  Ana

## 2022-10-28 NOTE — TELEPHONE ENCOUNTER
Current quarantine recommendations are 7 days from onset of symptoms, however if she is around a high risk person would recommend quarantining for him 4 days from the onset of symptoms, does not need to retest, thanks

## 2023-02-28 ENCOUNTER — TELEPHONE (OUTPATIENT)
Dept: FAMILY MEDICINE CLINIC | Facility: CLINIC | Age: 68
End: 2023-02-28

## 2023-02-28 NOTE — TELEPHONE ENCOUNTER
Caller: Ana Sandoval    Relationship: Self    Best call back number: [phone]    What does billing need from the patient: [facility] BILLING- WRONG CODING

## 2023-02-28 NOTE — TELEPHONE ENCOUNTER
I added dx codes to labs from July. She can have them recheck if needed. Otherwise they can send us a form requesting additional dx. Attempted to reach patient but no answer

## 2023-03-09 NOTE — TELEPHONE ENCOUNTER
Caller: Ana Sandoval    Relationship to patient: Self    Best call back number: [phone]    Patient is needing:   WOULD LIKE TO KNOW THE STATUS AND WHERE THIS WAS SENT TO. [facility] IS STATING THEY NEVER RECEIVED ANYTHING.

## 2023-03-10 NOTE — TELEPHONE ENCOUNTER
I sent the patient a [employer] message. If [facility] needs additional information on these labs from July of last year they need to send us a form.

## 2023-07-03 PROBLEM — E66.3 OVERWEIGHT (BMI 25.0-29.9): Status: ACTIVE | Noted: 2023-07-03

## 2023-08-04 ENCOUNTER — HOSPITAL ENCOUNTER (OUTPATIENT)
Dept: BONE DENSITY | Facility: HOSPITAL | Age: 68
Discharge: HOME | End: 2023-08-04
Payer: MEDICARE

## 2023-08-04 ENCOUNTER — HOSPITAL ENCOUNTER (OUTPATIENT)
Dept: MAMMOGRAPHY | Facility: HOSPITAL | Age: 68
Discharge: HOME | End: 2023-08-04
Payer: MEDICARE

## 2023-08-04 DIAGNOSIS — N95.8 OTHER SPECIFIED MENOPAUSAL AND PERIMENOPAUSAL DISORDERS: ICD-10-CM

## 2023-08-04 DIAGNOSIS — Z12.31 SCREENING MAMMOGRAM, ENCOUNTER FOR: ICD-10-CM

## 2023-08-04 PROCEDURE — 77067 SCR MAMMO BI INCL CAD: CPT

## 2023-08-04 PROCEDURE — 77080 DXA BONE DENSITY AXIAL: CPT

## 2023-08-04 PROCEDURE — 77063 BREAST TOMOSYNTHESIS BI: CPT

## 2023-09-28 ENCOUNTER — TELEPHONE (OUTPATIENT)
Dept: FAMILY MEDICINE CLINIC | Facility: CLINIC | Age: 68
End: 2023-09-28
Payer: COMMERCIAL

## 2023-09-28 DIAGNOSIS — E78.2 MIXED HYPERLIPIDEMIA: Primary | ICD-10-CM

## 2023-09-28 NOTE — TELEPHONE ENCOUNTER
----- Message from Ana Sandoval sent at 9/27/2023 11:30 AM EDT -----  Regarding: Blood test for liver enzymes   Contact: [phone]  You requested I get a blood test and then come in for an appointment.  How do I get the orders for the blood work? Is it a fasting test?  I'm waiting to see what my 's medical schedule is (Chemo/radiation) before making an appointment in October.     I also got my new Covid shot today, 27 Sep 2023.  Will get flu shot in a couple weeks.    Thanks,  Ana

## 2023-10-03 NOTE — PROGRESS NOTES
Subjective   Ana Sandoval is a 68 y.o. female. Presents to [facility]    Chief Complaint   Patient presents with    Hyperlipidemia       History of Present Illness  Patient is here to follow up on labs.   Hyperlipidemia  This is a new problem. Exacerbating diseases include obesity. She has no history of diabetes. Pertinent negatives include no chest pain or shortness of breath. She is currently on no antihyperlipidemic treatment. Risk factors for coronary artery disease include post-menopausal and family history.        I personally reviewed and updated the patient's allergies, medications, problem list, and past medical, surgical, social, and family history. I have reviewed and confirmed the accuracy of the History of Present Illness and Review of Symptoms as documented by the MA/LPN/RN. May Encinas MD    Allergies:  Allergies   Allergen Reactions    Penicillins Rash and Hives    Tetracycline Hives    Erythromycin Rash       Social History:  Social History     Socioeconomic History    Marital status:    Tobacco Use    Smoking status: Never   Substance and Sexual Activity    Alcohol use: Yes     Alcohol/week: 3.0 standard drinks of alcohol     Types: 1 Glasses of wine, 1 Cans of beer, 1 Shots of liquor per week     Comment: This is one per week of either beer/wine/liquor    Drug use: Never    Sexual activity: Yes     Partners: Male     Birth control/protection: Post-menopausal       Family History:  Family History   Problem Relation Age of Onset    Heart disease Mother     Arthritis Mother     COPD Mother     Hyperlipidemia Mother     Stroke Mother         no lasting effects    Thyroid disease Mother         controlled with meds    Heart disease Father     Kidney disease Father     Arthritis Father         Not dibilitating    COPD Father     Hyperlipidemia Father     Vision loss Father         macular degeneration    Hearing loss Brother         since young child    Breast cancer Neg  "Hx     Ovarian cancer Neg Hx        Past Medical History :  Patient Active Problem List   Diagnosis    Acne rosacea    Allergic rhinitis    Chronic insomnia    Menopause    Vitamin D deficiency    Right foot pain    Gastroesophageal reflux disease without esophagitis    Screening for colon cancer    Medicare annual wellness visit, initial    Screening mammogram, encounter for    Anxiety    Tick bite of abdominal wall    Overweight (BMI 25.0-29.9)    Pure hypercholesterolemia       Medication List:    Current Outpatient Medications:     Calcium-Vitamin D 600-200 MG-UNIT per tablet, Take  by mouth., Disp: , Rfl:     Cetirizine HCl (ZyrTEC Allergy) 10 MG capsule, Take 1 capsule by mouth Daily., Disp: , Rfl:     cholecalciferol (VITAMIN D3) 25 MCG (1000 UT) tablet, , Disp: , Rfl:     Coenzyme Q10 (COQ10 PO), Take  by mouth., Disp: , Rfl:     diphenhydrAMINE HCl (BENADRYL ALLERGY PO), Take  by mouth., Disp: , Rfl:     famotidine (PEPCID) 10 MG tablet, Take 1 tablet by mouth At Night As Needed for Heartburn., Disp: , Rfl:     Multiple Vitamins-Minerals (PreserVision AREDS 2+Multi Vit) capsule, , Disp: , Rfl:     Omega-3 1000 MG capsule, , Disp: , Rfl:     Propylene Glycol (SYSTANE COMPLETE OP), , Disp: , Rfl:     rosuvastatin (CRESTOR) 5 MG tablet, Take 1 tablet by mouth Daily., Disp: 30 tablet, Rfl: 12    Past Surgical History:  Past Surgical History:   Procedure Laterality Date    CHOLECYSTECTOMY  2003    COLONOSCOPY  2/2/2017    Good for 10 years    FINGER SURGERY  2019    nodule on left pointer finger         Physical Exam:      Vital Signs:    Vitals:    10/09/23 1135   BP: 122/84   Pulse: 105   Resp: 19   Temp: 97.5 øF (36.4 øC)   SpO2: 98%        /84 (BP Location: Right arm, Patient Position: Sitting, Cuff Size: Adult)   Pulse 105   Temp 97.5 øF (36.4 øC) (Temporal)   Resp 19   Ht 170.2 cm (67\")   Wt 72.2 kg (159 lb 3.2 oz)   SpO2 98% Comment: room air  BMI 24.93 kg/mý     Wt Readings from Last 3 " Encounters:   10/09/23 72.2 kg (159 lb 3.2 oz)   07/03/23 72.5 kg (159 lb 12.8 oz)   09/02/22 72.6 kg (160 lb)       Result Review :   The following data was reviewed by: May Encinas MD on 10/09/2023:  CMP          7/14/2023    09:34 10/4/2023    09:13   CMP   Glucose 99  87    BUN 16  18    Creatinine 0.99  1.04    Sodium 145  143    Potassium 4.6  4.3    Chloride 106  104    Calcium 9.6  9.0    Total Protein 7.2  7.1    Albumin 4.7  4.4    Globulin 2.5  2.7    Total Bilirubin 0.4  0.3    Alkaline Phosphatase 94  97    AST (SGOT) 34  22    ALT (SGPT) 35  24    BUN/Creatinine Ratio 16  17      Lipid Panel          7/14/2023    09:34 10/4/2023    09:13   Lipid Panel   Total Cholesterol 218  210    Triglycerides 101  94    HDL Cholesterol 81  73    VLDL Cholesterol 18  17    LDL Cholesterol  119  120               Physical Exam  Vitals reviewed.   Constitutional:       Appearance: Normal appearance. She is well-developed.   HENT:      Head: Normocephalic and atraumatic.   Eyes:      General:         Right eye: No discharge.         Left eye: No discharge.   Cardiovascular:      Rate and Rhythm: Normal rate and regular rhythm.      Heart sounds: Normal heart sounds. No murmur heard.     No friction rub. No gallop.   Pulmonary:      Effort: Pulmonary effort is normal. No respiratory distress.      Breath sounds: Normal breath sounds. No wheezing or rales.   Skin:     General: Skin is warm and dry.      Findings: No rash.   Neurological:      Mental Status: She is alert and oriented to person, place, and time.      Coordination: Coordination normal.      Gait: Gait normal.   Psychiatric:         Behavior: Behavior is cooperative.       Assessment and Plan:  Problems Addressed this Visit          Cardiac and Vasculature    Pure hypercholesterolemia     Lipid abnormalities are worsening.  Nutritional counseling was provided.  Lipids will be reassessed in 3 months.  Crestor sent in  Diagnosis, treatment and and course  discussed. Potential side effects discussed. Return if there is worsening or persistence of symptoms.            Relevant Medications    rosuvastatin (CRESTOR) 5 MG tablet     Other Visit Diagnoses       Need for influenza vaccination    -  Primary    Relevant Orders    Fluzone High-Dose 65+yrs (Completed)          Diagnoses         Codes Comments    Need for influenza vaccination    -  Primary ICD-10-CM: Z23  ICD-9-CM: V04.81     Pure hypercholesterolemia     ICD-10-CM: E78.00  ICD-9-CM: 272.0                     An After Visit Summary and PPPS were given to the patient.

## 2023-10-05 LAB
ALBUMIN SERPL-MCNC: 4.4 G/DL (ref 3.9–4.9)
ALBUMIN/GLOB SERPL: 1.6 {RATIO} (ref 1.2–2.2)
ALP SERPL-CCNC: 97 IU/L (ref 44–121)
ALT SERPL-CCNC: 24 IU/L (ref 0–32)
AST SERPL-CCNC: 22 IU/L (ref 0–40)
BILIRUB SERPL-MCNC: 0.3 MG/DL (ref 0–1.2)
BUN SERPL-MCNC: 18 MG/DL (ref 8–27)
BUN/CREAT SERPL: 17 (ref 12–28)
CALCIUM SERPL-MCNC: 9 MG/DL (ref 8.7–10.3)
CHLORIDE SERPL-SCNC: 104 MMOL/L (ref 96–106)
CHOLEST SERPL-MCNC: 210 MG/DL (ref 100–199)
CHOLEST/HDLC SERPL: 2.9 RATIO (ref 0–4.4)
CO2 SERPL-SCNC: 26 MMOL/L (ref 20–29)
CREAT SERPL-MCNC: 1.04 MG/DL (ref 0.57–1)
EGFRCR SERPLBLD CKD-EPI 2021: 59 ML/MIN/1.73
GLOBULIN SER CALC-MCNC: 2.7 G/DL (ref 1.5–4.5)
GLUCOSE SERPL-MCNC: 87 MG/DL (ref 70–99)
HDLC SERPL-MCNC: 73 MG/DL
LDLC SERPL CALC-MCNC: 120 MG/DL (ref 0–99)
POTASSIUM SERPL-SCNC: 4.3 MMOL/L (ref 3.5–5.2)
PROT SERPL-MCNC: 7.1 G/DL (ref 6–8.5)
SODIUM SERPL-SCNC: 143 MMOL/L (ref 134–144)
TRIGL SERPL-MCNC: 94 MG/DL (ref 0–149)
VLDLC SERPL CALC-MCNC: 17 MG/DL (ref 5–40)

## 2023-10-09 ENCOUNTER — OFFICE VISIT (OUTPATIENT)
Dept: FAMILY MEDICINE CLINIC | Facility: CLINIC | Age: 68
End: 2023-10-09
Payer: MEDICARE

## 2023-10-09 VITALS
BODY MASS INDEX: 24.99 KG/M2 | OXYGEN SATURATION: 98 % | HEART RATE: 105 BPM | RESPIRATION RATE: 19 BRPM | SYSTOLIC BLOOD PRESSURE: 122 MMHG | DIASTOLIC BLOOD PRESSURE: 84 MMHG | WEIGHT: 159.2 LBS | HEIGHT: 67 IN | TEMPERATURE: 97.5 F

## 2023-10-09 DIAGNOSIS — E78.00 PURE HYPERCHOLESTEROLEMIA: ICD-10-CM

## 2023-10-09 DIAGNOSIS — Z23 NEED FOR INFLUENZA VACCINATION: Primary | ICD-10-CM

## 2023-10-09 RX ORDER — ROSUVASTATIN CALCIUM 5 MG/1
5 TABLET, COATED ORAL DAILY
Qty: 30 TABLET | Refills: 12 | Status: SHIPPED | OUTPATIENT
Start: 2023-10-09

## 2023-10-16 NOTE — ASSESSMENT & PLAN NOTE
Lipid abnormalities are worsening.  Nutritional counseling was provided.  Lipids will be reassessed in 3 months.  Crestor sent in  Diagnosis, treatment and and course discussed. Potential side effects discussed. Return if there is worsening or persistence of symptoms.

## 2024-01-05 ENCOUNTER — TELEPHONE (OUTPATIENT)
Dept: FAMILY MEDICINE CLINIC | Facility: CLINIC | Age: 69
End: 2024-01-05
Payer: COMMERCIAL

## 2024-01-05 DIAGNOSIS — E78.00 PURE HYPERCHOLESTEROLEMIA: Primary | ICD-10-CM

## 2024-01-11 LAB
ALBUMIN SERPL-MCNC: 4.5 G/DL (ref 3.9–4.9)
ALBUMIN/GLOB SERPL: 1.8 {RATIO} (ref 1.2–2.2)
ALP SERPL-CCNC: 89 IU/L (ref 44–121)
ALT SERPL-CCNC: 25 IU/L (ref 0–32)
AST SERPL-CCNC: 22 IU/L (ref 0–40)
BILIRUB SERPL-MCNC: 0.4 MG/DL (ref 0–1.2)
BUN SERPL-MCNC: 14 MG/DL (ref 8–27)
BUN/CREAT SERPL: 14 (ref 12–28)
CALCIUM SERPL-MCNC: 9.6 MG/DL (ref 8.7–10.3)
CHLORIDE SERPL-SCNC: 105 MMOL/L (ref 96–106)
CHOLEST SERPL-MCNC: 167 MG/DL (ref 100–199)
CHOLEST/HDLC SERPL: 1.9 RATIO (ref 0–4.4)
CO2 SERPL-SCNC: 26 MMOL/L (ref 20–29)
CREAT SERPL-MCNC: 1 MG/DL (ref 0.57–1)
EGFRCR SERPLBLD CKD-EPI 2021: 62 ML/MIN/1.73
GLOBULIN SER CALC-MCNC: 2.5 G/DL (ref 1.5–4.5)
GLUCOSE SERPL-MCNC: 89 MG/DL (ref 70–99)
HDLC SERPL-MCNC: 86 MG/DL
LDLC SERPL CALC-MCNC: 64 MG/DL (ref 0–99)
POTASSIUM SERPL-SCNC: 4.2 MMOL/L (ref 3.5–5.2)
PROT SERPL-MCNC: 7 G/DL (ref 6–8.5)
SODIUM SERPL-SCNC: 144 MMOL/L (ref 134–144)
TRIGL SERPL-MCNC: 92 MG/DL (ref 0–149)
VLDLC SERPL CALC-MCNC: 17 MG/DL (ref 5–40)

## 2024-01-15 NOTE — PROGRESS NOTES
Subjective   Ana Sandoval is a 68 y.o. female. Presents to [facility]    Chief Complaint   Patient presents with    Hyperlipidemia       Hyperlipidemia  This is a new problem. Exacerbating diseases include obesity. She has no history of diabetes. Pertinent negatives include no chest pain or shortness of breath. Current antihyperlipidemic treatment includes statins. Risk factors for coronary artery disease include post-menopausal and family history.        I personally reviewed and updated the patient's allergies, medications, problem list, and past medical, surgical, social, and family history. I have reviewed and confirmed the accuracy of the History of Present Illness and Review of Symptoms as documented by the MA/LPN/RN. May Encinas MD    Allergies:  Allergies   Allergen Reactions    Penicillins Rash and Hives    Tetracycline Hives    Erythromycin Rash       Social History:  Social History     Socioeconomic History    Marital status:    Tobacco Use    Smoking status: Never   Substance and Sexual Activity    Alcohol use: Yes     Alcohol/week: 3.0 standard drinks of alcohol     Types: 1 Glasses of wine, 1 Cans of beer, 1 Shots of liquor per week     Comment: This is one per week of either beer/wine/liquor    Drug use: Never    Sexual activity: Yes     Partners: Male     Birth control/protection: Post-menopausal       Family History:  Family History   Problem Relation Age of Onset    Heart disease Mother     Arthritis Mother     COPD Mother     Hyperlipidemia Mother     Stroke Mother         no lasting effects    Thyroid disease Mother         controlled with meds    Heart disease Father     Kidney disease Father     Arthritis Father         Not dibilitating    COPD Father     Hyperlipidemia Father     Vision loss Father         macular degeneration    Hearing loss Brother         since young child    Breast cancer Neg Hx     Ovarian cancer Neg Hx        Past Medical History  ":  Patient Active Problem List   Diagnosis    Acne rosacea    Allergic rhinitis    Chronic insomnia    Menopause    Vitamin D deficiency    Right foot pain    Gastroesophageal reflux disease without esophagitis    Screening for colon cancer    Medicare annual wellness visit, initial    Screening mammogram, encounter for    Anxiety    Overweight (BMI 25.0-29.9)    Pure hypercholesterolemia       Medication List:    Current Outpatient Medications:     Calcium-Vitamin D 600-200 MG-UNIT per tablet, Take  by mouth., Disp: , Rfl:     Cetirizine HCl (ZyrTEC Allergy) 10 MG capsule, Take 1 capsule by mouth Daily., Disp: , Rfl:     cholecalciferol (VITAMIN D3) 25 MCG (1000 UT) tablet, , Disp: , Rfl:     Coenzyme Q10 (COQ10 PO), Take  by mouth., Disp: , Rfl:     diphenhydrAMINE HCl (BENADRYL ALLERGY PO), Take  by mouth., Disp: , Rfl:     famotidine (PEPCID) 10 MG tablet, Take 1 tablet by mouth At Night As Needed for Heartburn., Disp: , Rfl:     Multiple Vitamins-Minerals (PreserVision AREDS 2+Multi Vit) capsule, , Disp: , Rfl:     Omega-3 1000 MG capsule, , Disp: , Rfl:     Propylene Glycol (SYSTANE COMPLETE OP), , Disp: , Rfl:     rosuvastatin (CRESTOR) 5 MG tablet, Take 1 tablet by mouth Daily., Disp: 30 tablet, Rfl: 12    Past Surgical History:  Past Surgical History:   Procedure Laterality Date    CHOLECYSTECTOMY  2003    COLONOSCOPY  2/2/2017    Good for 10 years    FINGER SURGERY  2019    nodule on left pointer finger         Physical Exam:      Vital Signs:    Vitals:    01/16/24 1058   BP: 130/84   Pulse: 65   Resp: 17   Temp: 97.5 °F (36.4 °C)   SpO2: 98%        /84 (BP Location: Left arm, Patient Position: Sitting, Cuff Size: Adult)   Pulse 65   Temp 97.5 °F (36.4 °C) (Temporal)   Resp 17   Ht 170.2 cm (67\")   Wt 72.5 kg (159 lb 12.8 oz)   SpO2 98% Comment: room air  BMI 25.03 kg/m²     Wt Readings from Last 3 Encounters:   01/16/24 72.5 kg (159 lb 12.8 oz)   10/09/23 72.2 kg (159 lb 3.2 oz)   07/03/23 " 72.5 kg (159 lb 12.8 oz)       Result Review :   The following data was reviewed by: May Encinas MD on 01/16/2024:  CMP          7/14/2023    09:34 10/4/2023    09:13 1/10/2024    08:42   CMP   Glucose 99  87  89    BUN 16  18  14    Creatinine 0.99  1.04  1.00    Sodium 145  143  144    Potassium 4.6  4.3  4.2    Chloride 106  104  105    Calcium 9.6  9.0  9.6    Total Protein 7.2  7.1  7.0    Albumin 4.7  4.4  4.5    Globulin 2.5  2.7  2.5    Total Bilirubin 0.4  0.3  0.4    Alkaline Phosphatase 94  97  89    AST (SGOT) 34  22  22    ALT (SGPT) 35  24  25    BUN/Creatinine Ratio 16  17  14      Lipid Panel          7/14/2023    09:34 10/4/2023    09:13 1/10/2024    08:42   Lipid Panel   Total Cholesterol 218  210  167    Triglycerides 101  94  92    HDL Cholesterol 81  73  86    VLDL Cholesterol 18  17  17    LDL Cholesterol  119  120  64               Physical Exam  Vitals reviewed.   Constitutional:       Appearance: Normal appearance. She is well-developed.   HENT:      Head: Normocephalic and atraumatic.   Eyes:      General:         Right eye: No discharge.         Left eye: No discharge.   Cardiovascular:      Rate and Rhythm: Normal rate and regular rhythm.      Heart sounds: Normal heart sounds. No murmur heard.     No friction rub. No gallop.   Pulmonary:      Effort: Pulmonary effort is normal. No respiratory distress.      Breath sounds: Normal breath sounds. No wheezing or rales.   Skin:     General: Skin is warm and dry.      Findings: No rash.   Neurological:      Mental Status: She is alert and oriented to person, place, and time.      Coordination: Coordination normal.      Gait: Gait normal.   Psychiatric:         Behavior: Behavior is cooperative.         Assessment and Plan:  Problems Addressed this Visit          Cardiac and Vasculature    Pure hypercholesterolemia - Primary     Lipid abnormalities are improving with treatment.  Nutritional counseling was provided.  Lipids will be  reassessed in 3 months.            Endocrine and Metabolic    Overweight (BMI 25.0-29.9)     Diagnoses         Codes Comments    Pure hypercholesterolemia    -  Primary ICD-10-CM: E78.00  ICD-9-CM: 272.0     Overweight (BMI 25.0-29.9)     ICD-10-CM: E66.3  ICD-9-CM: 278.02              BMI is within normal parameters. No other follow-up for BMI required.      An After Visit Summary and PPPS were given to the patient.

## 2024-01-16 ENCOUNTER — OFFICE VISIT (OUTPATIENT)
Dept: FAMILY MEDICINE CLINIC | Facility: CLINIC | Age: 69
End: 2024-01-16
Payer: MEDICARE

## 2024-01-16 VITALS
DIASTOLIC BLOOD PRESSURE: 84 MMHG | HEIGHT: 67 IN | BODY MASS INDEX: 25.08 KG/M2 | TEMPERATURE: 97.5 F | SYSTOLIC BLOOD PRESSURE: 130 MMHG | OXYGEN SATURATION: 98 % | HEART RATE: 65 BPM | RESPIRATION RATE: 17 BRPM | WEIGHT: 159.8 LBS

## 2024-01-16 DIAGNOSIS — E66.3 OVERWEIGHT (BMI 25.0-29.9): ICD-10-CM

## 2024-01-16 DIAGNOSIS — E78.00 PURE HYPERCHOLESTEROLEMIA: Primary | ICD-10-CM
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
## 2024-01-16 PROBLEM — S30.861A TICK BITE OF ABDOMINAL WALL: Status: RESOLVED | Noted: 2022-07-01 | Resolved: 2024-01-16

## 2024-06-26 ENCOUNTER — TELEPHONE (OUTPATIENT)
Dept: FAMILY MEDICINE CLINIC | Facility: CLINIC | Age: 69
End: 2024-06-26
Payer: COMMERCIAL

## 2024-06-26 NOTE — TELEPHONE ENCOUNTER
Caller: Ana Sandoval    Relationship: Self    Best call back number: [phone]     What medication are you requesting: SOMETHING FOR NAUSEA    What are your current symptoms: NAUSEA    How long have you been experiencing symptoms: COUPLE DAYS    Have you had these symptoms before:    [] Yes  [] No    Have you been treated for these symptoms before:   [] Yes  [] No    If a prescription is needed, what is your preferred pharmacy and phone number: [id number] - [address] AT [facility] OF  &  - [phone] [facility] [phone] FX     Additional notes: PATIENT HAS ON HAND PROMETHAZINE 25 MG AND PROCHLORTERAZINE 10 MG. PATIENT IS ASKING IF SHE CAN TAKE EITHER OF THESE OR HAVE SOMETHING CALLED IN FOR HER. PLEASE CALL AND ADVISE.

## 2024-06-28 NOTE — PROGRESS NOTES
Subjective   Ana Sandoval is a 69 y.o. female. Presents to [facility]    Chief Complaint   Patient presents with    Vomiting       History of Present Illness  Ana was seen at [facility] on 06/28/24. She was seen for vomiting. Labs that were performed during the encounter included: CMP-abnormal, CBC-abnormal, and UA-abnormal. Diagnostic studies that were performed included: CT Scan-abnormal. Medication changes: pantoprazole, zofran.   Vomiting   This is a new problem. The current episode started more than 1 month ago. The problem has been resolved. The emesis has an appearance of stomach contents and bile. There has been no fever. Pertinent negatives include no abdominal pain, chest pain, chills, diarrhea, dizziness, fever, headaches, sweats or URI. She has tried increased fluids (zofran, pantoprazole) for the symptoms.        I personally reviewed and updated the patient's allergies, medications, problem list, and past medical, surgical, social, and family history. I have reviewed and confirmed the accuracy of the History of Present Illness and Review of Symptoms as documented by the MA/LPN/RN. May Encinas MD    Allergies:  Allergies   Allergen Reactions    Penicillins Rash and Hives    Tetracycline Hives    Erythromycin Rash       Social History:  Social History     Socioeconomic History    Marital status:    Tobacco Use    Smoking status: Never   Vaping Use    Vaping status: Never Used   Substance and Sexual Activity    Alcohol use: Yes     Alcohol/week: 3.0 standard drinks of alcohol     Types: 1 Glasses of wine, 1 Cans of beer, 1 Shots of liquor per week     Comment: This is one per week of either beer/wine/liquor    Drug use: Never    Sexual activity: Yes     Partners: Male     Birth control/protection: Post-menopausal       Family History:  Family History   Problem Relation Age of Onset    Heart disease Mother     Arthritis Mother     COPD Mother      Hyperlipidemia Mother     Stroke Mother         no lasting effects    Thyroid disease Mother         controlled with meds    Heart disease Father     Kidney disease Father     Arthritis Father         Not dibilitating    COPD Father     Hyperlipidemia Father     Vision loss Father         macular degeneration    Hearing loss Brother         since young child    Breast cancer Neg Hx     Ovarian cancer Neg Hx        Past Medical History :  Patient Active Problem List   Diagnosis    Acne rosacea    Allergic rhinitis    Chronic insomnia    Menopause    Vitamin D deficiency    Right foot pain    Gastroesophageal reflux disease without esophagitis    Screening for colon cancer    Medicare annual wellness visit, initial    Screening mammogram, encounter for    Anxiety    Overweight (BMI 25.0-29.9)    Pure hypercholesterolemia    Gastritis    Pain of upper abdomen    Family history of cardiovascular disease    Skin lesion       Medication List:    Current Outpatient Medications:     Calcium-Vitamin D 600-200 MG-UNIT per tablet, Take  by mouth., Disp: , Rfl:     Cetirizine HCl (ZyrTEC Allergy) 10 MG capsule, Take 1 capsule by mouth Daily., Disp: , Rfl:     cholecalciferol (VITAMIN D3) 25 MCG (1000 UT) tablet, , Disp: , Rfl:     Coenzyme Q10 (COQ10 PO), Take  by mouth., Disp: , Rfl:     diphenhydrAMINE HCl (BENADRYL ALLERGY PO), Take  by mouth., Disp: , Rfl:     Omega-3 1000 MG capsule, , Disp: , Rfl:     pantoprazole (PROTONIX) 40 MG EC tablet, Take 1 tablet by mouth Daily., Disp: 30 tablet, Rfl: 2    Propylene Glycol (SYSTANE COMPLETE OP), , Disp: , Rfl:     rosuvastatin (CRESTOR) 5 MG tablet, Take 1 tablet by mouth Daily., Disp: 30 tablet, Rfl: 12    Multiple Vitamins-Minerals (PreserVision AREDS 2+Multi Vit) capsule, , Disp: , Rfl:     sucralfate (CARAFATE) 1 g tablet, Take 1 tablet by mouth 4 (Four) Times a Day., Disp: 120 tablet, Rfl: 0    Past Surgical History:  Past Surgical History:   Procedure Laterality Date     "CHOLECYSTECTOMY  2003    COLONOSCOPY  2/2/2017    Good for 10 years    FINGER SURGERY  2019    nodule on left pointer finger         Physical Exam:      Vital Signs:    Vitals:    07/05/24 0910   BP: 122/78   Pulse: 99   Resp: 18   Temp: 97.8 °F (36.6 °C)   SpO2: 97%        /78   Pulse 99   Temp 97.8 °F (36.6 °C) (Temporal)   Resp 18   Ht 170.2 cm (67\")   Wt 72.9 kg (160 lb 12.8 oz)   SpO2 97%   BMI 25.18 kg/m²     Wt Readings from Last 3 Encounters:   07/15/24 72.5 kg (159 lb 12.8 oz)   07/05/24 72.9 kg (160 lb 12.8 oz)   01/16/24 72.5 kg (159 lb 12.8 oz)       Result Review :   The following data was reviewed by: May Encinas MD on 07/05/2024:           Brief Urine Lab Results  (Last result in the past 365 days)        Color   Clarity   Blood   Leuk Est   Nitrite   Protein   CREAT   Urine HCG        07/15/24 1544 Yellow   Clear   Negative   Negative   Negative   Negative                     Physical Exam  Vitals reviewed.   Constitutional:       Appearance: Normal appearance. She is well-developed.   HENT:      Head: Normocephalic and atraumatic.   Eyes:      General:         Right eye: No discharge.         Left eye: No discharge.   Cardiovascular:      Rate and Rhythm: Normal rate and regular rhythm.      Heart sounds: Normal heart sounds. No murmur heard.     No friction rub. No gallop.   Pulmonary:      Effort: Pulmonary effort is normal. No respiratory distress.      Breath sounds: Normal breath sounds. No wheezing or rales.   Skin:     General: Skin is warm and dry.      Findings: No rash.   Neurological:      Mental Status: She is alert and oriented to person, place, and time.      Coordination: Coordination normal.      Gait: Gait normal.   Psychiatric:         Behavior: Behavior is cooperative.         Assessment and Plan:  Problems Addressed this Visit          Gastrointestinal Abdominal     Gastritis - Primary     Cause of illness.   Increase fluids, liquid diet only while vomiting, " advance to bland diet once vomiting stops. Discussed dehydration signs and symptoms. Return in 2 days or sooner if needed. If acutely worse, go to the ER.     Start PPI and carafate    Diagnosis, treatment and and course discussed. Potential side effects discussed. Return if there is worsening or persistence of symptoms.            Relevant Medications    sucralfate (CARAFATE) 1 g tablet    pantoprazole (PROTONIX) 40 MG EC tablet    Pain of upper abdomen    Relevant Orders    POC Urinalysis Dipstick, Multipro (Completed)     Diagnoses         Codes Comments    Acute gastritis without hemorrhage, unspecified gastritis type    -  Primary ICD-10-CM: K29.00  ICD-9-CM: 535.00     Pain of upper abdomen     ICD-10-CM: R10.10  ICD-9-CM: 789.09                          An After Visit Summary and PPPS were given to the patient.

## 2024-07-05 ENCOUNTER — OFFICE VISIT (OUTPATIENT)
Dept: FAMILY MEDICINE CLINIC | Facility: CLINIC | Age: 69
End: 2024-07-05
Payer: MEDICARE

## 2024-07-05 VITALS
RESPIRATION RATE: 18 BRPM | HEART RATE: 99 BPM | WEIGHT: 160.8 LBS | TEMPERATURE: 97.8 F | HEIGHT: 67 IN | OXYGEN SATURATION: 97 % | SYSTOLIC BLOOD PRESSURE: 122 MMHG | DIASTOLIC BLOOD PRESSURE: 78 MMHG | BODY MASS INDEX: 25.24 KG/M2

## 2024-07-05 DIAGNOSIS — K29.00 ACUTE GASTRITIS WITHOUT HEMORRHAGE, UNSPECIFIED GASTRITIS TYPE: Primary | ICD-10-CM

## 2024-07-05 DIAGNOSIS — R10.10 PAIN OF UPPER ABDOMEN: ICD-10-CM

## 2024-07-05 PROBLEM — K29.70 GASTRITIS: Status: ACTIVE | Noted: 2024-07-05

## 2024-07-05 LAB
BILIRUB BLD-MCNC: NEGATIVE MG/DL
CLARITY, POC: CLEAR
COLOR UR: YELLOW
GLUCOSE UR STRIP-MCNC: NEGATIVE MG/DL
KETONES UR QL: NEGATIVE
LEUKOCYTE EST, POC: NEGATIVE
NITRITE UR-MCNC: NEGATIVE MG/ML
PH UR: 5 [PH] (ref 5–8)
PROT UR STRIP-MCNC: ABNORMAL MG/DL
RBC # UR STRIP: NEGATIVE /UL
SP GR UR: 1.02 (ref 1–1.03)
UROBILINOGEN UR QL: NORMAL

## 2024-07-05 PROCEDURE — G2211 COMPLEX E/M VISIT ADD ON: HCPCS | Performed by: FAMILY MEDICINE

## 2024-07-05 PROCEDURE — 81003 URINALYSIS AUTO W/O SCOPE: CPT | Performed by: FAMILY MEDICINE

## 2024-07-05 PROCEDURE — 1126F AMNT PAIN NOTED NONE PRSNT: CPT | Performed by: FAMILY MEDICINE

## 2024-07-05 PROCEDURE — 99213 OFFICE O/P EST LOW 20 MIN: CPT | Performed by: FAMILY MEDICINE

## 2024-07-05 RX ORDER — PANTOPRAZOLE SODIUM 40 MG/1
40 TABLET, DELAYED RELEASE ORAL DAILY
Qty: 30 TABLET | Refills: 2 | Status: SHIPPED | OUTPATIENT
Start: 2024-07-05

## 2024-07-05 RX ORDER — SUCRALFATE 1 G/1
1 TABLET ORAL 4 TIMES DAILY
Qty: 120 TABLET | Refills: 0 | Status: SHIPPED | OUTPATIENT
Start: 2024-07-05

## 2024-07-05 RX ORDER — PANTOPRAZOLE SODIUM 40 MG/1
TABLET, DELAYED RELEASE ORAL
COMMUNITY
Start: 2024-06-28 | End: 2024-07-05 | Stop reason: SDUPTHER

## 2024-07-11 NOTE — PROGRESS NOTES
Subsequent Medicare Wellness Visit    Subjective    History of Present Illness:  Ana Sandoval is a 69 y.o. female who presents for a Subsequent Medicare Wellness Visit.    The following portions of the patient's history were reviewed and   updated as appropriate: allergies, current medications, past family history, past medical history, past social history, past surgical history, and problem list.  Family History   Problem Relation Age of Onset   • Heart disease Mother    • Arthritis Mother    • COPD Mother    • Hyperlipidemia Mother    • Stroke Mother         no lasting effects   • Thyroid disease Mother         controlled with meds   • Heart disease Father    • Kidney disease Father    • Arthritis Father         Not dibilitating   • COPD Father    • Hyperlipidemia Father    • Vision loss Father         macular degeneration   • Hearing loss Brother         since young child   • Breast cancer Neg Hx    • Ovarian cancer Neg Hx      Past Surgical History:   Procedure Laterality Date   • CHOLECYSTECTOMY  2003   • COLONOSCOPY  2/2/2017    Good for 10 years   • FINGER SURGERY  2019    nodule on left pointer finger        Compared to one year ago, the patient feels her physical   health is the same.    Compared to one year ago, the patient feels her mental   health is the same.    Recent Hospitalizations:  She was not admitted to the hospital during the last year.       Current Medical Providers:  Patient Care Team:  May Encinas MD as PCP - General (Family Medicine)    Outpatient Medications Prior to Visit   Medication Sig Dispense Refill   • Calcium-Vitamin D 600-200 MG-UNIT per tablet Take  by mouth.     • Cetirizine HCl (ZyrTEC Allergy) 10 MG capsule Take 1 capsule by mouth Daily.     • cholecalciferol (VITAMIN D3) 25 MCG (1000 UT) tablet      • Coenzyme Q10 (COQ10 PO) Take  by mouth.     • diphenhydrAMINE HCl (BENADRYL ALLERGY PO) Take  by mouth.     • Multiple Vitamins-Minerals (PreserVision AREDS 2+Multi  "Vit) capsule      • Omega-3 1000 MG capsule      • pantoprazole (PROTONIX) 40 MG EC tablet Take 1 tablet by mouth Daily. 30 tablet 2   • Propylene Glycol (SYSTANE COMPLETE OP)      • rosuvastatin (CRESTOR) 5 MG tablet Take 1 tablet by mouth Daily. 30 tablet 12   • sucralfate (CARAFATE) 1 g tablet Take 1 tablet by mouth 4 (Four) Times a Day. 120 tablet 0     No facility-administered medications prior to visit.     Pain Score    07/15/24 1528   PainSc: 0-No pain      No opioid medication identified on active medication list. I have reviewed chart for other potential  high risk medication/s and harmful drug interactions in the elderly.        Aspirin is not on active medication list.  Aspirin use is not indicated based on review of current medical condition/s. Risk of harm outweighs potential benefits.  .    Patient Active Problem List   Diagnosis   • Acne rosacea   • Allergic rhinitis   • Chronic insomnia   • Menopause   • Vitamin D deficiency   • Right foot pain   • Gastroesophageal reflux disease without esophagitis   • Screening for colon cancer   • Medicare annual wellness visit, initial   • Screening mammogram, encounter for   • Anxiety   • Overweight (BMI 25.0-29.9)   • Pure hypercholesterolemia   • Gastritis   • Pain of upper abdomen   • Family history of cardiovascular disease   • Skin lesion     Advance Care Planning  Advance Directive is on file.  ACP discussion was held with the patient during this visit. Patient has an advance directive in EMR which is still valid.            Objective    Vitals:    07/15/24 1528   BP: 132/82   BP Location: Right arm   Patient Position: Sitting   Cuff Size: Adult   Pulse: 111   Resp: 19   Temp: 97.5 °F (36.4 °C)   TempSrc: Temporal   SpO2: 98%  Comment: room air   Weight: 72.5 kg (159 lb 12.8 oz)   Height: 170.2 cm (67\")   PainSc: 0-No pain       Does the patient have evidence of cognitive impairment? No           HEALTH RISK ASSESSMENT    Smoking Status:  Social History "     Tobacco Use   Smoking Status Never   Smokeless Tobacco Not on file     Alcohol Consumption:  Social History     Substance and Sexual Activity   Alcohol Use Yes   • Alcohol/week: 3.0 standard drinks of alcohol   • Types: 1 Glasses of wine, 1 Cans of beer, 1 Shots of liquor per week    Comment: This is one per week of either beer/wine/liquor     Fall Risk Screen:    EMORY Fall Risk Assessment was completed, and patient is at LOW risk for falls.Assessment completed on:7/15/2024    GAIT AND BALANCE:  Steady and normal     Depression Screenin/5/2024     9:00 AM   PHQ-2/PHQ-9 Depression Screening   Little Interest or Pleasure in Doing Things 2-->more than half the days   Feeling Down, Depressed or Hopeless 2-->more than half the days   Trouble Falling or Staying Asleep, or Sleeping Too Much 2-->more than half the days   Feeling Tired or Having Little Energy 2-->more than half the days   Poor Appetite or Overeating 0-->not at all   Feeling Bad about Yourself - or that You are a Failure or Have Let Yourself or Your Family Down 0-->not at all   Trouble Concentrating on Things, Such as Reading the Newspaper or Watching Television 0-->not at all   Moving or Speaking So Slowly that Other People Could Have Noticed? Or the Opposite - Being So Fidgety 0-->not at all   Thoughts that You Would be Better Off Dead or of Hurting Yourself in Some Way 0-->not at all   PHQ-9: Brief Depression Severity Measure Score 8   If You Checked Off Any Problems, How Difficult Have These Problems Made It For You to Do Your Work, Take Care of Things at Home, or Get Along with Other People? somewhat difficult       Health Habits and Functional and Cognitive Screenin/14/2024     4:02 PM   Functional & Cognitive Status   Do you have difficulty preparing food and eating? No   Do you have difficulty bathing yourself, getting dressed or grooming yourself? No   Do you have difficulty using the toilet? No   Do you have difficulty moving  around from place to place? No   Do you have trouble with steps or getting out of a bed or a chair? No   Current Diet Other   Dental Exam Up to date   Eye Exam Other   Exercise (times per week) 5 times per week   Current Exercises Include Gardening;Home Exercise Program (TV, Computer, Etc.);House Cleaning;Light Weights;Walking;Yard Work   Do you need help using the phone?  No   Are you deaf or do you have serious difficulty hearing?  No   Do you need help to go to places out of walking distance? No   Do you need help shopping? No   Do you need help preparing meals?  No   Do you need help with housework?  No   Do you need help with laundry? No   Do you need help taking your medications? No   Do you need help managing money? No   Do you ever drive or ride in a car without wearing a seat belt? No   Have you felt unusual stress, anger or loneliness in the last month? Yes   Who do you live with? Spouse   If you need help, do you have trouble finding someone available to you? No   Have you been bothered in the last four weeks by sexual problems? No   Do you have difficulty concentrating, remembering or making decisions? No       Age-appropriate Screening Schedule:  Refer to the list below for future screening recommendations based on patient's age, sex and/or medical conditions. Orders for these recommended tests are listed in the plan section. The patient has been provided with a written plan.    Health Maintenance   Topic Date Due   • ZOSTER VACCINE (3 of 3) 02/03/2023   • COVID-19 Vaccine (6 - 2023-24 season) 11/22/2023   • BMI FOLLOWUP  07/03/2024   • INFLUENZA VACCINE  08/01/2024   • LIPID PANEL  01/10/2025   • ANNUAL WELLNESS VISIT  07/15/2025   • MAMMOGRAM  08/04/2025   • DXA SCAN  08/04/2025   • COLORECTAL CANCER SCREENING  02/02/2027   • TDAP/TD VACCINES (5 - Td or Tdap) 07/03/2031   • HEPATITIS C SCREENING  Completed   • Pneumococcal Vaccine 65+  Completed              Assessment & Plan   Medically necessary,  significant, and separately identifiable medical problems identified during this visit are addressed on a separate visit note.    CMS Preventative Services Quick Reference  Risk Factors Identified During Encounter  None Identified  The above risks/problems have been discussed with the patient.  Follow up actions/plans if indicated are seen below in the Assessment/Plan Section.  Pertinent information has been shared with the patient in the After Visit Summary.    Follow Up:   No follow-ups on file.     An After Visit Summary and PPPS were made available to the patient.    Medicare Wellness  Personal Prevention Plan of Service     Date of Office Visit:  07/15/2024  Encounter Provider:  May Encinas MD  Place of Service:  [facility]  Patient Name: Ana Sandoval  :  1955    As part of the Medicare Wellness portion of your visit today, we are providing you with this personalized preventive plan of services (PPPS). This plan is based upon recommendations of the [place] Preventive Services Task Force (USPSTF) and the Advisory Committee on Immunization Practices (ACIP).    This lists the preventive care services that should be considered, and provides dates of when you are due. Items listed as completed are up-to-date and do not require any further intervention.    Health Maintenance   Topic Date Due   • ZOSTER VACCINE (3 of 3) 2023   • COVID-19 Vaccine (2023- season) 2023   • BMI FOLLOWUP  2024   • INFLUENZA VACCINE  2024   • LIPID PANEL  01/10/2025   • ANNUAL WELLNESS VISIT  07/15/2025   • MAMMOGRAM  2025   • DXA SCAN  2025   • COLORECTAL CANCER SCREENING  2027   • TDAP/TD VACCINES (5 - Td or Tdap) 2031   • HEPATITIS C SCREENING  Completed   • Pneumococcal Vaccine 65+  Completed       Orders Placed This Encounter   Procedures   • Mammo Screening Digital Tomosynthesis Bilateral With CAD     Standing Status:   Future      Standing Expiration Date:   7/15/2025     Order Specific Question:   Reason for Exam:     Answer:   screening     Order Specific Question:   Release to patient     Answer:   Routine Release [1400000002]   • CT Cardiac Calcium Score Without Dye     Standing Status:   Future     Standing Expiration Date:   7/15/2025     Order Specific Question:   Release to patient     Answer:   Routine Release [1400000002]     Order Specific Question:   Reason for Exam:     Answer:   family hx of cad   • Lipid Panel With / Chol / HDL Ratio     Order Specific Question:   Release to patient     Answer:   Routine Release [1400000002]   • TSH     Order Specific Question:   Release to patient     Answer:   Routine Release [1400000002]   • POCT urinalysis dipstick, manual     Order Specific Question:   Release to patient     Answer:   Routine Release [1400000002]       No follow-ups on file.  Sit-to-Stand Exercise    The sit-to-stand exercise (also known as the chair stand or chair rise exercise) strengthens your lower body and helps you maintain or improve your mobility and independence. The goal is to do the sit-to-stand exercise without using your hands. This will be easier as you become stronger. You should always talk with your health care provider before starting any exercise program, especially if you have had recent surgery.  Do the exercise exactly as told by your health care provider and adjust it as directed. It is normal to feel mild stretching, pulling, tightness, or discomfort as you do this exercise, but you should stop right away if you feel sudden pain or your pain gets worse. Do not begin doing this exercise until told by your health care provider.  What the sit-to-stand exercise does  The sit-to-stand exercise helps to strengthen the muscles in your thighs and the muscles in the center of your body that give you stability (core muscles). This exercise is especially helpful if:  You have had knee or hip surgery.  You  have trouble getting up from a chair, out of a car, or off the toilet.  How to do the sit-to-stand exercise  Sit toward the front edge of a sturdy chair without armrests. Your knees should be bent and your feet should be flat on the floor and shoulder-width apart.  Place your hands lightly on each side of the seat. Keep your back and neck as straight as possible, with your chest slightly forward.  Breathe in slowly. Lean forward and slightly shift your weight to the front of your feet.  Breathe out as you slowly stand up. Use your hands as little as possible.  Stand and pause for a full breath in and out.  Breathe in as you sit down slowly. Tighten your core and abdominal muscles to control your lowering as much as possible.  Breathe out slowly.  Do this exercise 10-15 times. If needed, do it fewer times until you build up strength.  Rest for 1 minute, then do another set of 10-15 repetitions.  To change the difficulty of the sit-to-stand exercise  If the exercise is too difficult, use a chair with sturdy armrests, and push off the armrests to help you come to the standing position. You can also use the armrests to help slowly lower yourself back to sitting. As this gets easier, try to use your arms less. You can also place a firm cushion or pillow on the chair to make the surface higher.  If this exercise is too easy, do not use your arms to help raise or lower yourself. You can also wear a weighted vest, use hand weights, increase your repetitions, or try a lower chair.  General tips  You may feel tired when starting an exercise routine. This is normal.  You may have muscle soreness that lasts a few days. This is normal. As you get stronger, you may not feel muscle soreness.  Use smooth, steady movements.  Do not  hold your breath during strength exercises. This can cause unsafe changes in your blood pressure.  Breathe in slowly through your nose, and breathe out slowly through your mouth.  Summary  Strengthening  your lower body is an important step to help you move safely and independently.  The sit-to-stand exercise helps strengthen the muscles in your thighs and core.  You should always talk with your health care provider before starting any exercise program, especially if you have had recent surgery.  This information is not intended to replace advice given to you by your health care provider. Make sure you discuss any questions you have with your health care provider.  Document Revised: 10/16/2019 Document Reviewed: 02/08/2018  Elsevier Patient Education © 2021 [employer]    Advance Care Planning and Advance Directives     You make decisions on a daily basis - decisions about where you want to live, your career, your home, your life. Perhaps one of the most important decisions you face is your choice for future medical care. Take time to talk with your family and your healthcare team and start planning today.  Advance Care Planning is a process that can help you:  Understand possible future healthcare decisions in light of your own experiences  Reflect on those decision in light of your goals and values  Discuss your decisions with those closest to you and the healthcare professionals that care for you  Make a plan by creating a document that reflects your wishes    Surrogate Decision Maker  In the event of a medical emergency, which has left you unable to communicate or to make your own decisions, you would need someone to make decisions for you.  It is important to discuss your preferences for medical treatment with this person while you are in good health.     Qualities of a surrogate decision maker:  Willing to take on this role and responsibility  Knows what you want for future medical care  Willing to follow your wishes even if they don't agree with them  Able to make difficult medical decisions under stressful circumstances    Advance Directives  These are legal documents you can create that will guide your  healthcare team and decision maker(s) when needed. These documents can be stored in the electronic medical record.    Living Will - a legal document to guide your care if you have a terminal condition or a serious illness and are unable to communicate. States vary by statute in document names/types, but most forms may include one or more of the following:        -  Directions regarding life-prolonging treatments        -  Directions regarding artificially provided nutrition/hydration        -  Choosing a healthcare decision maker        -  Direction regarding organ/tissue donation    Durable Power of  for Healthcare - this document names an -in-fact to make medical decisions for you, but it may also allow this person to make personal and financial decisions for you. Please seek the advice of an  if you need this type of document.    **Advance Directives are not required and no one may discriminate against you if you do not sign one.    Medical Orders  Many states allow specific forms/orders signed by your physician to record your wishes for medical treatment in your current state of health. This form, signed in personal communication with your physician, addresses resuscitation and other medical interventions that you may or may not want.  For more information or to schedule a time with a [facility] Advance Care Planning Facilitator contact: [facility]/ACP or call [phone] and someone will contact you directly.    Fall Prevention in the Home, Adult  Falls can cause injuries and can happen to people of all ages. There are many things you can do to make your home safe and to help prevent falls. Ask for help when making these changes.  What actions can I take to prevent falls?  General Instructions  Use good lighting in all rooms. Replace any light bulbs that burn out.  Turn on the lights in dark areas. Use night-lights.  Keep items that you use often in easy-to-reach places.  Lower the shelves around your home if needed.  Set up your furniture so you have a clear path. Avoid moving your furniture around.  Do not have throw rugs or other things on the floor that can make you trip.  Avoid walking on wet floors.  If any of your floors are uneven, fix them.  Add color or contrast paint or tape to clearly mary and help you see:  Grab bars or handrails.  First and last steps of staircases.  Where the edge of each step is.  If you use a stepladder:  Make sure that it is fully opened. Do not climb a closed stepladder.  Make sure the sides of the stepladder are locked in place.  Ask someone to hold the stepladder while you use it.  Know where your pets are when moving through your home.  What can I do in the bathroom?         Keep the floor dry. Clean up any water on the floor right away.  Remove soap buildup in the tub or shower.  Use nonskid mats or decals on the floor of the tub or shower.  Attach bath mats securely with double-sided, nonslip rug tape.  If you need to sit down in the shower, use a plastic, nonslip stool.  Install grab bars by the toilet and in the tub and shower. Do not use towel bars as grab bars.  What can I do in the bedroom?  Make sure that you have a light by your bed that is easy to reach.  Do not use any sheets or blankets for your bed that hang to the floor.  Have a firm chair with side arms that you can use for support when you get dressed.  What can I do in the kitchen?  Clean up any spills right away.  If you need to reach something above you, use a step stool with a grab bar.  Keep electrical cords out of the way.  Do not use floor polish or wax that makes floors slippery.  What can I do with my stairs?  Do not leave any items on the stairs.  Make sure that you have a light switch at the top and the bottom of the stairs.  Make sure that there are handrails on both sides of the stairs. Fix handrails that are broken or loose.  Install nonslip stair treads on all your  stairs.  Avoid having throw rugs at the top or bottom of the stairs.  Choose a carpet that does not hide the edge of the steps on the stairs.  Check carpeting to make sure that it is firmly attached to the stairs. Fix carpet that is loose or worn.  What can I do on the outside of my home?  Use bright outdoor lighting.  Fix the edges of walkways and driveways and fix any cracks.  Remove anything that might make you trip as you walk through a door, such as a raised step or threshold.  Trim any bushes or trees on paths to your home.  Check to see if handrails are loose or broken and that both sides of all steps have handrails.  Install guardrails along the edges of any raised decks and porches.  Clear paths of anything that can make you trip, such as tools or rocks.  Have leaves, snow, or ice cleared regularly.  Use sand or salt on paths during winter.  Clean up any spills in your garage right away. This includes grease or oil spills.  What other actions can I take?  Wear shoes that:  Have a low heel. Do not wear high heels.  Have rubber bottoms.  Feel good on your feet and fit well.  Are closed at the toe. Do not wear open-toe sandals.  Use tools that help you move around if needed. These include:  Canes.  Walkers.  Scooters.  Crutches.  Review your medicines with your doctor. Some medicines can make you feel dizzy. This can increase your chance of falling.  Ask your doctor what else you can do to help prevent falls.  Where to find more information  Centers for Disease Control and PreventionEMORY: www.cdc.gov  National [place] on Aging: www.carrie.nih.gov  Contact a doctor if:  You are afraid of falling at home.  You feel weak, drowsy, or dizzy at home.  You fall at home.  Summary  There are many simple things that you can do to make your home safe and to help prevent falls.  Ways to make your home safe include removing things that can make you trip and installing grab bars in the bathroom.  Ask for help when making  these changes in your home.  This information is not intended to replace advice given to you by your health care provider. Make sure you discuss any questions you have with your health care provider.  Document Revised: 07/21/2021 Document Reviewed: 07/21/2021  [employer] Patient Education © 2021 [employer]         +++++E/M portion medically necessary secondary to new or uncontrolled chronic problem+++++++    Subjective   Ana Sandoval is here for:    Chief Complaint   Patient presents with   • Medicare Wellness-subsequent   • Hyperlipidemia       Hyperlipidemia  This is a chronic problem. The current episode started more than 1 year ago. She has no history of diabetes or obesity. Pertinent negatives include no chest pain or shortness of breath. Current antihyperlipidemic treatment includes statins and herbal therapy. The current treatment provides moderate improvement of lipids. Risk factors for coronary artery disease include dyslipidemia, post-menopausal and family history.         Physical Exam:  Review of Systems   Respiratory:  Negative for shortness of breath.    Cardiovascular:  Negative for chest pain.        Physical Exam  Vitals and nursing note reviewed.   Constitutional:       General: She is not in acute distress.     Appearance: She is well-developed. She is not diaphoretic.   HENT:      Head: Normocephalic and atraumatic.      Right Ear: Tympanic membrane and external ear normal.      Left Ear: Tympanic membrane and external ear normal.      Nose: Nose normal.      Mouth/Throat:      Pharynx: No oropharyngeal exudate.   Eyes:      General: No scleral icterus.        Right eye: No discharge.         Left eye: No discharge.      Conjunctiva/sclera: Conjunctivae normal.      Pupils: Pupils are equal, round, and reactive to light.   Neck:      Thyroid: No thyromegaly.      Trachea: No tracheal deviation.   Cardiovascular:      Rate and Rhythm: Normal rate and regular rhythm.      Heart sounds: Normal  heart sounds. No murmur heard.     No friction rub. No gallop.   Pulmonary:      Effort: Pulmonary effort is normal. No respiratory distress.      Breath sounds: Normal breath sounds. No stridor. No wheezing or rales.   Abdominal:      General: Bowel sounds are normal. There is no distension.      Palpations: Abdomen is soft. There is no mass.      Tenderness: There is no abdominal tenderness. There is no guarding or rebound.   Musculoskeletal:         General: No tenderness or deformity. Normal range of motion.      Cervical back: Normal range of motion and neck supple.   Lymphadenopathy:      Cervical: No cervical adenopathy.   Skin:     General: Skin is warm and dry.      Capillary Refill: Capillary refill takes less than 2 seconds.      Coloration: Skin is not pale.      Findings: No erythema or rash.          Neurological:      Mental Status: She is alert and oriented to person, place, and time.      Cranial Nerves: No cranial nerve deficit.      Sensory: No sensory deficit.      Motor: No tremor, atrophy or abnormal muscle tone.      Coordination: Coordination normal.      Gait: Gait normal.      Deep Tendon Reflexes: Reflexes are normal and symmetric. Reflexes normal.   Psychiatric:         Behavior: Behavior normal.         Thought Content: Thought content normal.         Cognition and Memory: Memory is not impaired. She does not exhibit impaired recent memory or impaired remote memory.         Judgment: Judgment normal.       Result Review :   The following data was reviewed by: May Encinas MD on 07/15/2024:         Brief Urine Lab Results  (Last result in the past 365 days)        Color   Clarity   Blood   Leuk Est   Nitrite   Protein   CREAT   Urine HCG        07/15/24 1544 Yellow   Clear   Negative   Negative   Negative   Negative                   Assessment and Plan:  Problem List Items Addressed This Visit          Cardiac and Vasculature    Pure hypercholesterolemia    Relevant Orders    Lipid  Panel With / Chol / HDL Ratio       Endocrine and Metabolic    Overweight (BMI 25.0-29.9)       Family History    Family history of cardiovascular disease       Health Encounters    Medicare annual wellness visit, initial - Primary       Skin    Skin lesion    Current Assessment & Plan     On her back on right  SK          Other Visit Diagnoses       Encounter for screening mammogram for malignant neoplasm of breast        Relevant Orders    Mammo Screening Digital Tomosynthesis Bilateral With CAD    Screening for cardiovascular condition        Relevant Orders    CT Cardiac Calcium Score Without Dye    Vascular Screening (Bundle) CAR            BMI is >= 25 and <30. (Overweight) The following options were offered after discussion;: weight loss educational material (shared in after visit summary), exercise counseling/recommendations, and nutrition counseling/recommendations

## 2024-07-15 ENCOUNTER — OFFICE VISIT (OUTPATIENT)
Dept: FAMILY MEDICINE CLINIC | Facility: CLINIC | Age: 69
End: 2024-07-15
Payer: MEDICARE

## 2024-07-15 VITALS
HEIGHT: 67 IN | HEART RATE: 111 BPM | OXYGEN SATURATION: 98 % | WEIGHT: 159.8 LBS | BODY MASS INDEX: 25.08 KG/M2 | RESPIRATION RATE: 19 BRPM | SYSTOLIC BLOOD PRESSURE: 132 MMHG | TEMPERATURE: 97.5 F | DIASTOLIC BLOOD PRESSURE: 82 MMHG

## 2024-07-15 DIAGNOSIS — Z13.6 SCREENING FOR CARDIOVASCULAR CONDITION: ICD-10-CM

## 2024-07-15 DIAGNOSIS — Z12.31 ENCOUNTER FOR SCREENING MAMMOGRAM FOR MALIGNANT NEOPLASM OF BREAST: ICD-10-CM

## 2024-07-15 DIAGNOSIS — E66.3 OVERWEIGHT (BMI 25.0-29.9): ICD-10-CM

## 2024-07-15 DIAGNOSIS — Z00.00 MEDICARE ANNUAL WELLNESS VISIT, SUBSEQUENT: Primary | ICD-10-CM

## 2024-07-15 DIAGNOSIS — E78.00 PURE HYPERCHOLESTEROLEMIA: ICD-10-CM

## 2024-07-15 DIAGNOSIS — Z82.49 FAMILY HISTORY OF CARDIOVASCULAR DISEASE: ICD-10-CM

## 2024-07-15 DIAGNOSIS — L98.9 SKIN LESION: ICD-10-CM

## 2024-07-15 LAB
BILIRUB BLD-MCNC: NEGATIVE MG/DL
CLARITY, POC: CLEAR
COLOR UR: YELLOW
GLUCOSE UR STRIP-MCNC: NEGATIVE MG/DL
KETONES UR QL: NEGATIVE
LEUKOCYTE EST, POC: NEGATIVE
NITRITE UR-MCNC: NEGATIVE MG/ML
PH UR: 5 [PH] (ref 5–8)
PROT UR STRIP-MCNC: NEGATIVE MG/DL
RBC # UR STRIP: NEGATIVE /UL
SP GR UR: 1.01 (ref 1–1.03)
UROBILINOGEN UR QL: NORMAL

## 2024-07-18 NOTE — ASSESSMENT & PLAN NOTE
Cause of illness.   Increase fluids, liquid diet only while vomiting, advance to bland diet once vomiting stops. Discussed dehydration signs and symptoms. Return in 2 days or sooner if needed. If acutely worse, go to the ER.     Start PPI and carafate    Diagnosis, treatment and and course discussed. Potential side effects discussed. Return if there is worsening or persistence of symptoms.

## 2024-07-29 RX ORDER — PANTOPRAZOLE SODIUM 40 MG/1
40 TABLET, DELAYED RELEASE ORAL DAILY
Qty: 30 TABLET | Refills: 2 | Status: SHIPPED | OUTPATIENT
Start: 2024-07-29

## 2024-08-07 ENCOUNTER — HOSPITAL ENCOUNTER (OUTPATIENT)
Dept: MAMMOGRAPHY | Facility: HOSPITAL | Age: 69
Discharge: HOME | End: 2024-08-07
Admitting: FAMILY MEDICINE
Payer: MEDICARE

## 2024-08-07 DIAGNOSIS — Z12.31 ENCOUNTER FOR SCREENING MAMMOGRAM FOR MALIGNANT NEOPLASM OF BREAST: ICD-10-CM

## 2024-08-07 PROCEDURE — 77063 BREAST TOMOSYNTHESIS BI: CPT

## 2024-08-07 PROCEDURE — 77067 SCR MAMMO BI INCL CAD: CPT

## 2024-08-13 NOTE — PROGRESS NOTES
Subjective   Ana Sandoval is a 69 y.o. female. Presents to [facility]    Chief Complaint   Patient presents with    Vomiting       Vomiting   This is a new problem. The current episode started more than 1 month ago. The problem has been resolved. The emesis has an appearance of stomach contents and bile. There has been no fever. Associated symptoms include abdominal pain and sweats. Pertinent negatives include no chest pain, chills, coughing, diarrhea, dizziness, fever, headaches or URI. She has tried increased fluids (zofran) for the symptoms. The treatment provided mild relief.      The vomiting finally went away. She still feels a little acidic She did not get her protonix    I personally reviewed and updated the patient's allergies, medications, problem list, and past medical, surgical, social, and family history. I have reviewed and confirmed the accuracy of the History of Present Illness and Review of Symptoms as documented by the MA/LPN/RN. May Encinas MD    Allergies:  Allergies   Allergen Reactions    Penicillins Rash and Hives    Tetracycline Hives    Erythromycin Rash       Social History:  Social History     Socioeconomic History    Marital status:    Tobacco Use    Smoking status: Never   Vaping Use    Vaping status: Never Used   Substance and Sexual Activity    Alcohol use: Yes     Alcohol/week: 3.0 standard drinks of alcohol     Types: 1 Glasses of wine, 1 Cans of beer, 1 Shots of liquor per week     Comment: This is one per week of either beer/wine/liquor    Drug use: Never    Sexual activity: Yes     Partners: Male     Birth control/protection: Post-menopausal       Family History:  Family History   Problem Relation Age of Onset    Heart disease Mother     Arthritis Mother     COPD Mother     Hyperlipidemia Mother     Stroke Mother         no lasting effects    Thyroid disease Mother         controlled with meds    Heart disease Father     Kidney disease Father      Arthritis Father         Not dibilitating    COPD Father     Hyperlipidemia Father     Vision loss Father         macular degeneration    Hearing loss Brother         since young child    Breast cancer Neg Hx     Ovarian cancer Neg Hx        Past Medical History :  Patient Active Problem List   Diagnosis    Acne rosacea    Allergic rhinitis    Chronic insomnia    Menopause    Vitamin D deficiency    Right foot pain    Gastroesophageal reflux disease without esophagitis    Screening for colon cancer    Medicare annual wellness visit, initial    Screening mammogram, encounter for    Anxiety    Overweight (BMI 25.0-29.9)    Pure hypercholesterolemia    Gastritis    Pain of upper abdomen    Family history of cardiovascular disease    Skin lesion       Medication List:    Current Outpatient Medications:     Calcium-Vitamin D 600-200 MG-UNIT per tablet, Take  by mouth., Disp: , Rfl:     Cetirizine HCl (ZyrTEC Allergy) 10 MG capsule, Take 1 capsule by mouth Daily., Disp: , Rfl:     cholecalciferol (VITAMIN D3) 25 MCG (1000 UT) tablet, , Disp: , Rfl:     Coenzyme Q10 (COQ10 PO), Take  by mouth., Disp: , Rfl:     diphenhydrAMINE HCl (BENADRYL ALLERGY PO), Take  by mouth., Disp: , Rfl:     Multiple Vitamins-Minerals (PreserVision AREDS 2+Multi Vit) capsule, , Disp: , Rfl:     Omega-3 1000 MG capsule, , Disp: , Rfl:     Propylene Glycol (SYSTANE COMPLETE OP), , Disp: , Rfl:     rosuvastatin (CRESTOR) 5 MG tablet, Take 1 tablet by mouth Daily., Disp: 30 tablet, Rfl: 12    pantoprazole (PROTONIX) 40 MG EC tablet, TAKE 1 TABLET BY MOUTH EVERY DAY, Disp: 90 tablet, Rfl: 0    Past Surgical History:  Past Surgical History:   Procedure Laterality Date    CHOLECYSTECTOMY  2003    COLONOSCOPY  2/2/2017    Good for 10 years    FINGER SURGERY  2019    nodule on left pointer finger         Physical Exam:      Vital Signs:    Vitals:    08/19/24 1123   BP: 132/84   Pulse: 105   Resp: 17   Temp: 97.3 °F (36.3 °C)   SpO2: 97%        BP  "132/84 (BP Location: Right arm, Patient Position: Sitting, Cuff Size: Adult)   Pulse 105   Temp 97.3 °F (36.3 °C) (Temporal)   Resp 17   Ht 170.2 cm (67\")   Wt 71.7 kg (158 lb)   SpO2 97% Comment: ra  BMI 24.75 kg/m²     Wt Readings from Last 3 Encounters:   08/19/24 71.7 kg (158 lb)   07/15/24 72.5 kg (159 lb 12.8 oz)   07/05/24 72.9 kg (160 lb 12.8 oz)       Result Review :                Physical Exam  Vitals reviewed.   Constitutional:       Appearance: Normal appearance. She is well-developed.   HENT:      Head: Normocephalic and atraumatic.   Eyes:      General:         Right eye: No discharge.         Left eye: No discharge.   Cardiovascular:      Rate and Rhythm: Normal rate and regular rhythm.      Heart sounds: Normal heart sounds. No murmur heard.     No friction rub. No gallop.   Pulmonary:      Effort: Pulmonary effort is normal. No respiratory distress.      Breath sounds: Normal breath sounds. No wheezing or rales.   Skin:     General: Skin is warm and dry.      Findings: No rash.   Neurological:      Mental Status: She is alert and oriented to person, place, and time.      Coordination: Coordination normal.      Gait: Gait normal.   Psychiatric:         Behavior: Behavior is cooperative.         Assessment and Plan:  Problems Addressed this Visit          Endocrine and Metabolic    Overweight (BMI 25.0-29.9)       Gastrointestinal Abdominal     Gastritis - Primary     Some better. She is done with carafate but did not get protonix  Resend protonix    Limit tobacco, alcohol, caffeine, chocalate, citrus fruits, recumbency after meals and large portions. Discussed link between PPI's and increased risk of hip, wrist, and spine fractures            Diagnoses         Codes Comments    Acute gastritis without hemorrhage, unspecified gastritis type    -  Primary ICD-10-CM: K29.00  ICD-9-CM: 535.00     Overweight (BMI 25.0-29.9)     ICD-10-CM: E66.3  ICD-9-CM: 278.02                          An After " Visit Summary and PPPS were given to the patient.

## 2024-08-19 ENCOUNTER — OFFICE VISIT (OUTPATIENT)
Dept: FAMILY MEDICINE CLINIC | Facility: CLINIC | Age: 69
End: 2024-08-19
Payer: MEDICARE

## 2024-08-19 VITALS
OXYGEN SATURATION: 97 % | SYSTOLIC BLOOD PRESSURE: 132 MMHG | WEIGHT: 158 LBS | HEART RATE: 105 BPM | HEIGHT: 67 IN | DIASTOLIC BLOOD PRESSURE: 84 MMHG | RESPIRATION RATE: 17 BRPM | BODY MASS INDEX: 24.8 KG/M2 | TEMPERATURE: 97.3 F

## 2024-08-19 DIAGNOSIS — K29.00 ACUTE GASTRITIS WITHOUT HEMORRHAGE, UNSPECIFIED GASTRITIS TYPE: Primary | ICD-10-CM

## 2024-08-19 DIAGNOSIS — E66.3 OVERWEIGHT (BMI 25.0-29.9): ICD-10-CM

## 2024-08-19 PROCEDURE — 99213 OFFICE O/P EST LOW 20 MIN: CPT | Performed by: FAMILY MEDICINE

## 2024-08-19 PROCEDURE — 1126F AMNT PAIN NOTED NONE PRSNT: CPT | Performed by: FAMILY MEDICINE

## 2024-08-19 RX ORDER — PANTOPRAZOLE SODIUM 40 MG/1
40 TABLET, DELAYED RELEASE ORAL DAILY
Qty: 30 TABLET | Refills: 2 | Status: SHIPPED | OUTPATIENT
Start: 2024-08-19 | End: 2024-08-19

## 2024-08-19 RX ORDER — PANTOPRAZOLE SODIUM 40 MG/1
40 TABLET, DELAYED RELEASE ORAL DAILY
Qty: 90 TABLET | Refills: 0 | Status: SHIPPED | OUTPATIENT
Start: 2024-08-19

## 2024-08-28 ENCOUNTER — HOSPITAL ENCOUNTER (OUTPATIENT)
Dept: CT IMAGING | Facility: HOSPITAL | Age: 69
Discharge: HOME | End: 2024-08-28

## 2024-08-28 ENCOUNTER — HOSPITAL ENCOUNTER (OUTPATIENT)
Dept: ULTRASOUND IMAGING | Facility: HOSPITAL | Age: 69
Discharge: HOME | End: 2024-08-28

## 2024-08-28 DIAGNOSIS — Z13.6 SCREENING FOR CARDIOVASCULAR CONDITION: ICD-10-CM

## 2024-08-28 PROCEDURE — 93799 UNLISTED CV SVC/PROCEDURE: CPT

## 2024-08-28 PROCEDURE — 75571 CT HRT W/O DYE W/CA TEST: CPT

## 2024-08-29 LAB
BH CV LEA LEFT PTA DISTAL PSV: 58.8 CM/S
BH CV LEA RIGHT PTA DISTAL PSV: 60.4 CM/S
BH CV VAS SCREENING CAROTID CCA LEFT: 714 CM/SEC
BH CV VAS SCREENING CAROTID CCA RIGHT: 66.6 CM/SEC
BH CV VAS SCREENING CAROTID ICA LEFT: 36.4 CM/SEC
BH CV VAS SCREENING CAROTID ICA RIGHT: 48.2 CM/SEC
BH CV XLRA MEAS - MID AO DIAM: 1.5 CM
BH CV XLRA MEAS - PAD LEFT ABI PT: 1.2
BH CV XLRA MEAS - PAD LEFT ARM: 142 MMHG
BH CV XLRA MEAS - PAD LEFT LEG PT: 164 MMHG
BH CV XLRA MEAS - PAD RIGHT ABI PT: 1.3
BH CV XLRA MEAS - PAD RIGHT ARM: 138 MMHG
BH CV XLRA MEAS - PAD RIGHT LEG PT: 180 MMHG
BH CV XLRA MEAS LEFT DIST CCA EDV: 16.7 CM/SEC
BH CV XLRA MEAS LEFT DIST CCA PSV: 71.4 CM/SEC
BH CV XLRA MEAS LEFT ICA/CCA RATIO: 0.5
BH CV XLRA MEAS LEFT PROX ICA EDV: 10.7 CM/SEC
BH CV XLRA MEAS LEFT PROX ICA PSV: 36.4 CM/SEC
BH CV XLRA MEAS RIGHT DIST CCA EDV: 13.5 CM/SEC
BH CV XLRA MEAS RIGHT DIST CCA PSV: 66.6 CM/SEC
BH CV XLRA MEAS RIGHT ICA/CCA RATIO: 0.7
BH CV XLRA MEAS RIGHT PROX ICA EDV: 10.7 CM/SEC
BH CV XLRA MEAS RIGHT PROX ICA PSV: 48.2 CM/SEC
CHOLEST SERPL-MCNC: 147 MG/DL (ref 100–199)
CHOLEST/HDLC SERPL: 1.8 RATIO (ref 0–4.4)
HDLC SERPL-MCNC: 83 MG/DL
LDLC SERPL CALC-MCNC: 46 MG/DL (ref 0–99)
TRIGL SERPL-MCNC: 99 MG/DL (ref 0–149)
TSH SERPL DL<=0.005 MIU/L-ACNC: 1.49 UIU/ML (ref 0.45–4.5)
VLDLC SERPL CALC-MCNC: 18 MG/DL (ref 5–40)

## 2024-08-29 NOTE — ASSESSMENT & PLAN NOTE
Some better. She is done with carafate but did not get protonix  Resend protonix    Limit tobacco, alcohol, caffeine, chocalate, citrus fruits, recumbency after meals and large portions. Discussed link between PPI's and increased risk of hip, wrist, and spine fractures

## 2024-10-10 DIAGNOSIS — E78.00 PURE HYPERCHOLESTEROLEMIA: ICD-10-CM

## 2024-10-11 RX ORDER — ROSUVASTATIN CALCIUM 5 MG/1
5 TABLET, COATED ORAL DAILY
Qty: 90 TABLET | Refills: 4 | Status: SHIPPED | OUTPATIENT
Start: 2024-10-11

## 2024-10-21 NOTE — PROGRESS NOTES
Subjective   Ana Sandoval is a 69 y.o. female. Presents to [facility]    Chief Complaint   Patient presents with    Abdominal Pain       Abdominal Pain  This is a new problem. The current episode started more than 1 month ago. The onset quality is gradual. The problem occurs intermittently. The problem has been improved. The pain is located in the epigastric region. The pain is mild. The quality of the pain is burning. The abdominal pain does not radiate. Pertinent negatives include no constipation, diarrhea, frequency, headaches, hematuria, nausea or vomiting. Nothing aggravates the pain. The pain is relieved by Nothing. She has tried antacids for the symptoms. The treatment provided moderate relief.      She is under a lot of stress. Her  has nonhodkins lymphoma.     I personally reviewed and updated the patient's allergies, medications, problem list, and past medical, surgical, social, and family history. I have reviewed and confirmed the accuracy of the History of Present Illness and Review of Symptoms as documented by the MA/LPN/RN. May Encinas MD    Allergies:  Allergies   Allergen Reactions    Penicillins Rash and Hives    Tetracycline Hives    Erythromycin Rash       Social History:  Social History     Socioeconomic History    Marital status:    Tobacco Use    Smoking status: Never   Vaping Use    Vaping status: Never Used   Substance and Sexual Activity    Alcohol use: Yes     Alcohol/week: 3.0 standard drinks of alcohol     Types: 1 Glasses of wine, 1 Cans of beer, 1 Shots of liquor per week     Comment: This is one per week of either beer/wine/liquor    Drug use: Never    Sexual activity: Yes     Partners: Male     Birth control/protection: Post-menopausal       Family History:  Family History   Problem Relation Age of Onset    Heart disease Mother     Arthritis Mother     COPD Mother     Hyperlipidemia Mother     Stroke Mother         no lasting effects    Thyroid  disease Mother         controlled with meds    Heart disease Father     Kidney disease Father     Arthritis Father         Not dibilitating    COPD Father     Hyperlipidemia Father     Vision loss Father         macular degeneration    Hearing loss Brother         since young child    Breast cancer Neg Hx     Ovarian cancer Neg Hx        Past Medical History :  Patient Active Problem List   Diagnosis    Acne rosacea    Allergic rhinitis    Chronic insomnia    Menopause    Vitamin D deficiency    Right foot pain    Gastroesophageal reflux disease without esophagitis    Screening for colon cancer    Medicare annual wellness visit, initial    Screening mammogram, encounter for    Anxiety    Overweight (BMI 25.0-29.9)    Pure hypercholesterolemia    Gastritis    Pain of upper abdomen    Family history of cardiovascular disease    Skin lesion    Grief       Medication List:    Current Outpatient Medications:     Calcium-Vitamin D 600-200 MG-UNIT per tablet, Take  by mouth., Disp: , Rfl:     Cetirizine HCl (ZyrTEC Allergy) 10 MG capsule, Take 1 capsule by mouth Daily., Disp: , Rfl:     cholecalciferol (VITAMIN D3) 25 MCG (1000 UT) tablet, , Disp: , Rfl:     Coenzyme Q10 (COQ10 PO), Take  by mouth., Disp: , Rfl:     diphenhydrAMINE HCl (BENADRYL ALLERGY PO), Take  by mouth., Disp: , Rfl:     Multiple Vitamins-Minerals (PreserVision AREDS 2+Multi Vit) capsule, , Disp: , Rfl:     Omega-3 1000 MG capsule, , Disp: , Rfl:     pantoprazole (PROTONIX) 40 MG EC tablet, Take 1 tablet by mouth Daily., Disp: 90 tablet, Rfl: 0    Propylene Glycol (SYSTANE COMPLETE OP), , Disp: , Rfl:     rosuvastatin (CRESTOR) 5 MG tablet, TAKE 1 TABLET BY MOUTH EVERY DAY, Disp: 90 tablet, Rfl: 4    ALPRAZolam (XANAX) 0.5 MG tablet, Take 1 tablet by mouth At Night As Needed for Anxiety. Use sparingly., Disp: 15 tablet, Rfl: 0    sucralfate (CARAFATE) 1 g tablet, Take 1 tablet by mouth 4 (Four) Times a Day., Disp: 120 tablet, Rfl: 0    Past Surgical  "History:  Past Surgical History:   Procedure Laterality Date    CHOLECYSTECTOMY  2003    COLONOSCOPY  2/2/2017    Good for 10 years    FINGER SURGERY  2019    nodule on left pointer finger         Physical Exam:      Vital Signs:    Vitals:    10/22/24 1045   BP: 126/84   Pulse: 94   Resp: 18   Temp: 98 °F (36.7 °C)   SpO2: 99%        /84 (BP Location: Right arm, Patient Position: Sitting, Cuff Size: Adult)   Pulse 94   Temp 98 °F (36.7 °C) (Temporal)   Resp 18   Ht 170.2 cm (67\")   Wt 72.1 kg (159 lb)   SpO2 99% Comment: ra  BMI 24.90 kg/m²     Wt Readings from Last 3 Encounters:   10/22/24 72.1 kg (159 lb)   08/19/24 71.7 kg (158 lb)   07/15/24 72.5 kg (159 lb 12.8 oz)       Result Review :                Physical Exam  Vitals reviewed.   Constitutional:       Appearance: Normal appearance. She is well-developed.   HENT:      Head: Normocephalic and atraumatic.   Eyes:      General:         Right eye: No discharge.         Left eye: No discharge.   Cardiovascular:      Rate and Rhythm: Normal rate and regular rhythm.      Heart sounds: Normal heart sounds. No murmur heard.     No friction rub. No gallop.   Pulmonary:      Effort: Pulmonary effort is normal. No respiratory distress.      Breath sounds: Normal breath sounds. No wheezing or rales.   Skin:     General: Skin is warm and dry.      Findings: No rash.   Neurological:      Mental Status: She is alert and oriented to person, place, and time.      Coordination: Coordination normal.      Gait: Gait normal.   Psychiatric:         Behavior: Behavior is cooperative.       Assessment and Plan:  Problems Addressed this Visit          Endocrine and Metabolic    Overweight (BMI 25.0-29.9)       Gastrointestinal Abdominal     Gastritis - Primary     Some better.   Restart protonix  Her  is not doing well         Relevant Medications    pantoprazole (PROTONIX) 40 MG EC tablet    sucralfate (CARAFATE) 1 g tablet       Mental Health    Anxiety    " Relevant Medications    ALPRAZolam (XANAX) 0.5 MG tablet    Grief     Her  is ill  Good social support, no suicidal or homicidal ideation. Diagnosis and treatment discussed. Discussed counseling. Discussed medication, dosing and adverse effects. Return in 4 weeks  Xanax as needed         Relevant Medications    ALPRAZolam (XANAX) 0.5 MG tablet     Diagnoses         Codes Comments    Acute gastritis without hemorrhage, unspecified gastritis type    -  Primary ICD-10-CM: K29.00  ICD-9-CM: 535.00     Overweight (BMI 25.0-29.9)     ICD-10-CM: E66.3  ICD-9-CM: 278.02     Grief     ICD-10-CM: F43.21  ICD-9-CM: 309.0     Anxiety     ICD-10-CM: F41.9  ICD-9-CM: 300.00              BMI is within normal parameters. No other follow-up for BMI required.           An After Visit Summary and PPPS were given to the patient.       This document is intended for medical expert use only. Reading of this document by patients and/or patient's family without participating medical staff guidance may result in misinterpretation and unintended morbidity. Any interpretation of such data is the responsibility of the patient and/or family member responsible for the patient in concert with their primary or specialist providers, not to be left for sources of online searches such as [employer], [employer] or similar queries. Relying on these approaches to knowledge may result in misinterpretation, misguided goals of care and even death should patients or family members try recommendations outside of the realm of professional medical care.

## 2024-10-22 ENCOUNTER — OFFICE VISIT (OUTPATIENT)
Dept: FAMILY MEDICINE CLINIC | Facility: CLINIC | Age: 69
End: 2024-10-22
Payer: MEDICARE

## 2024-10-22 VITALS
OXYGEN SATURATION: 99 % | HEIGHT: 67 IN | TEMPERATURE: 98 F | BODY MASS INDEX: 24.96 KG/M2 | SYSTOLIC BLOOD PRESSURE: 126 MMHG | RESPIRATION RATE: 18 BRPM | WEIGHT: 159 LBS | DIASTOLIC BLOOD PRESSURE: 84 MMHG | HEART RATE: 94 BPM

## 2024-10-22 DIAGNOSIS — F43.21 GRIEF: ICD-10-CM

## 2024-10-22 DIAGNOSIS — F41.9 ANXIETY: ICD-10-CM

## 2024-10-22 DIAGNOSIS — E66.3 OVERWEIGHT (BMI 25.0-29.9): ICD-10-CM

## 2024-10-22 DIAGNOSIS — K29.00 ACUTE GASTRITIS WITHOUT HEMORRHAGE, UNSPECIFIED GASTRITIS TYPE: Primary | ICD-10-CM

## 2024-10-22 RX ORDER — PANTOPRAZOLE SODIUM 40 MG/1
40 TABLET, DELAYED RELEASE ORAL DAILY
Qty: 90 TABLET | Refills: 0 | Status: SHIPPED | OUTPATIENT
Start: 2024-10-22

## 2024-10-22 RX ORDER — ALPRAZOLAM 0.5 MG
0.5 TABLET ORAL NIGHTLY PRN
Qty: 15 TABLET | Refills: 0 | Status: SHIPPED | OUTPATIENT
Start: 2024-10-22

## 2024-10-22 RX ORDER — SUCRALFATE 1 G/1
1 TABLET ORAL 4 TIMES DAILY
Qty: 120 TABLET | Refills: 0 | Status: SHIPPED | OUTPATIENT
Start: 2024-10-22

## 2024-11-01 NOTE — ASSESSMENT & PLAN NOTE
Her  is ill  Good social support, no suicidal or homicidal ideation. Diagnosis and treatment discussed. Discussed counseling. Discussed medication, dosing and adverse effects. Return in 4 weeks  Xanax as needed

## 2024-11-18 NOTE — PROGRESS NOTES
Subjective   Ana Sandoval is a 69 y.o. female. Presents to [facility]    Chief Complaint   Patient presents with    Abdominal Pain       Abdominal Pain  This is a new problem. The current episode started more than 1 month ago. The onset quality is gradual. The problem occurs intermittently. The problem has been worse. The pain is located in the epigastric region. The pain is mild. The quality of the pain is burning. The abdominal pain does not radiate. Pertinent negatives include no constipation, diarrhea, frequency, headaches, hematuria, nausea or vomiting. Nothing aggravates the pain. The pain is relieved by Nothing. She has tried antacids for the symptoms. The treatment provided mild relief.        I personally reviewed and updated the patient's allergies, medications, problem list, and past medical, surgical, social, and family history. I have reviewed and confirmed the accuracy of the History of Present Illness and Review of Symptoms as documented by the MA/LPN/RN. May Encinas MD    Allergies:  Allergies   Allergen Reactions    Penicillins Rash and Hives    Tetracycline Hives    Erythromycin Rash       Social History:  Social History     Socioeconomic History    Marital status:    Tobacco Use    Smoking status: Never   Vaping Use    Vaping status: Never Used   Substance and Sexual Activity    Alcohol use: Yes     Alcohol/week: 3.0 standard drinks of alcohol     Types: 1 Glasses of wine, 1 Cans of beer, 1 Shots of liquor per week     Comment: This is one per week of either beer/wine/liquor    Drug use: Never    Sexual activity: Yes     Partners: Male     Birth control/protection: Post-menopausal       Family History:  Family History   Problem Relation Age of Onset    Heart disease Mother     Arthritis Mother     COPD Mother     Hyperlipidemia Mother     Stroke Mother         no lasting effects    Thyroid disease Mother         controlled with meds    Heart disease Father      Kidney disease Father     Arthritis Father         Not dibilitating    COPD Father     Hyperlipidemia Father     Vision loss Father         macular degeneration    Hearing loss Brother         since young child    Breast cancer Neg Hx     Ovarian cancer Neg Hx        Past Medical History :  Patient Active Problem List   Diagnosis    Acne rosacea    Allergic rhinitis    Chronic insomnia    Menopause    Vitamin D deficiency    Right foot pain    Gastroesophageal reflux disease without esophagitis    Screening for colon cancer    Medicare annual wellness visit, initial    Screening mammogram, encounter for    Anxiety    Overweight (BMI 25.0-29.9)    Pure hypercholesterolemia    Gastritis    Pain of upper abdomen    Family history of cardiovascular disease    Skin lesion    Grief       Medication List:    Current Outpatient Medications:     ALPRAZolam (XANAX) 0.5 MG tablet, Take 1 tablet by mouth At Night As Needed for Anxiety. Use sparingly., Disp: 15 tablet, Rfl: 0    Calcium-Vitamin D 600-200 MG-UNIT per tablet, Take  by mouth., Disp: , Rfl:     Cetirizine HCl (ZyrTEC Allergy) 10 MG capsule, Take 1 capsule by mouth Daily., Disp: , Rfl:     cholecalciferol (VITAMIN D3) 25 MCG (1000 UT) tablet, , Disp: , Rfl:     Coenzyme Q10 (COQ10 PO), Take  by mouth., Disp: , Rfl:     diphenhydrAMINE HCl (BENADRYL ALLERGY PO), Take  by mouth., Disp: , Rfl:     Multiple Vitamins-Minerals (PreserVision AREDS 2+Multi Vit) capsule, , Disp: , Rfl:     Omega-3 1000 MG capsule, , Disp: , Rfl:     pantoprazole (PROTONIX) 40 MG EC tablet, Take 1 tablet by mouth Daily., Disp: 90 tablet, Rfl: 0    Propylene Glycol (SYSTANE COMPLETE OP), , Disp: , Rfl:     rosuvastatin (CRESTOR) 5 MG tablet, TAKE 1 TABLET BY MOUTH EVERY DAY, Disp: 90 tablet, Rfl: 4    sucralfate (CARAFATE) 1 g tablet, Take 1 tablet by mouth 4 (Four) Times a Day., Disp: 120 tablet, Rfl: 0    Past Surgical History:  Past Surgical History:   Procedure Laterality Date     "CHOLECYSTECTOMY  2003    COLONOSCOPY  2/2/2017    Good for 10 years    FINGER SURGERY  2019    nodule on left pointer finger         Physical Exam:      Vital Signs:    Vitals:    11/21/24 0909   BP: 122/84   Pulse: 108   Resp: 18   Temp: 97.8 °F (36.6 °C)   SpO2: 99%        /84 (BP Location: Right arm, Patient Position: Sitting, Cuff Size: Adult)   Pulse 108   Temp 97.8 °F (36.6 °C) (Temporal)   Resp 18   Ht 170.2 cm (67\")   Wt 70.6 kg (155 lb 9.6 oz)   SpO2 99% Comment: ra  BMI 24.37 kg/m²     Wt Readings from Last 3 Encounters:   11/21/24 70.6 kg (155 lb 9.6 oz)   10/22/24 72.1 kg (159 lb)   08/19/24 71.7 kg (158 lb)       Result Review :                Physical Exam  Vitals reviewed.   Constitutional:       Appearance: Normal appearance. She is well-developed.   HENT:      Head: Normocephalic and atraumatic.   Eyes:      General:         Right eye: No discharge.         Left eye: No discharge.   Cardiovascular:      Rate and Rhythm: Normal rate and regular rhythm.      Heart sounds: Normal heart sounds. No murmur heard.     No friction rub. No gallop.   Pulmonary:      Effort: Pulmonary effort is normal. No respiratory distress.      Breath sounds: Normal breath sounds. No wheezing or rales.   Abdominal:      General: Abdomen is flat. Bowel sounds are normal. There is no distension.      Palpations: Abdomen is soft. There is no mass.      Tenderness: There is no abdominal tenderness. There is no guarding or rebound.      Hernia: No hernia is present.   Skin:     General: Skin is warm and dry.      Findings: No rash.   Neurological:      Mental Status: She is alert and oriented to person, place, and time.      Coordination: Coordination normal.      Gait: Gait normal.   Psychiatric:         Behavior: Behavior is cooperative.         Assessment and Plan:  Problems Addressed this Visit          Endocrine and Metabolic    Overweight (BMI 25.0-29.9)       Gastrointestinal Abdominal     Gastroesophageal " reflux disease without esophagitis     Worse  Pharmacy would not give her the rx  Printed out and given RX coupon         Relevant Medications    pantoprazole (PROTONIX) 40 MG EC tablet    Gastritis - Primary     Refilled carafate as the pharmacy did not give it to her    Discussed her seeing GI. Will try treatment first      Limit tobacco, alcohol, caffeine, chocalate, citrus fruits, recumbency after meals and large portions. Discussed link between PPI's and increased risk of hip, wrist, and spine fractures           Relevant Medications    pantoprazole (PROTONIX) 40 MG EC tablet     Diagnoses         Codes Comments    Acute gastritis without hemorrhage, unspecified gastritis type    -  Primary ICD-10-CM: K29.00  ICD-9-CM: 535.00     Overweight (BMI 25.0-29.9)     ICD-10-CM: E66.3  ICD-9-CM: 278.02     Gastroesophageal reflux disease without esophagitis     ICD-10-CM: K21.9  ICD-9-CM: 530.81              BMI is within normal parameters. No other follow-up for BMI required.           An After Visit Summary and PPPS were given to the patient.       This document is intended for medical expert use only. Reading of this document by patients and/or patient's family without participating medical staff guidance may result in misinterpretation and unintended morbidity. Any interpretation of such data is the responsibility of the patient and/or family member responsible for the patient in concert with their primary or specialist providers, not to be left for sources of online searches such as [employer], [employer] or similar queries. Relying on these approaches to knowledge may result in misinterpretation, misguided goals of care and even death should patients or family members try recommendations outside of the realm of professional medical care.

## 2024-11-21 ENCOUNTER — OFFICE VISIT (OUTPATIENT)
Dept: FAMILY MEDICINE CLINIC | Facility: CLINIC | Age: 69
End: 2024-11-21
Payer: MEDICARE

## 2024-11-21 VITALS
OXYGEN SATURATION: 99 % | RESPIRATION RATE: 18 BRPM | BODY MASS INDEX: 24.42 KG/M2 | HEIGHT: 67 IN | SYSTOLIC BLOOD PRESSURE: 122 MMHG | DIASTOLIC BLOOD PRESSURE: 84 MMHG | HEART RATE: 108 BPM | TEMPERATURE: 97.8 F | WEIGHT: 155.6 LBS

## 2024-11-21 DIAGNOSIS — K29.00 ACUTE GASTRITIS WITHOUT HEMORRHAGE, UNSPECIFIED GASTRITIS TYPE: Primary | ICD-10-CM

## 2024-11-21 DIAGNOSIS — K21.9 GASTROESOPHAGEAL REFLUX DISEASE WITHOUT ESOPHAGITIS: ICD-10-CM

## 2024-11-21 DIAGNOSIS — E66.3 OVERWEIGHT (BMI 25.0-29.9): ICD-10-CM

## 2024-11-21 PROCEDURE — 1125F AMNT PAIN NOTED PAIN PRSNT: CPT | Performed by: FAMILY MEDICINE

## 2024-11-21 PROCEDURE — G2211 COMPLEX E/M VISIT ADD ON: HCPCS | Performed by: FAMILY MEDICINE

## 2024-11-21 PROCEDURE — 99214 OFFICE O/P EST MOD 30 MIN: CPT | Performed by: FAMILY MEDICINE

## 2024-11-21 RX ORDER — PANTOPRAZOLE SODIUM 40 MG/1
40 TABLET, DELAYED RELEASE ORAL DAILY
Qty: 90 TABLET | Refills: 0 | Status: SHIPPED | OUTPATIENT
Start: 2024-11-21

## 2024-11-21 RX ORDER — PANTOPRAZOLE SODIUM 40 MG/1
40 TABLET, DELAYED RELEASE ORAL DAILY
Qty: 90 TABLET | Refills: 0 | Status: SHIPPED | OUTPATIENT
Start: 2024-11-21 | End: 2024-11-21

## 2024-11-29 NOTE — ASSESSMENT & PLAN NOTE
Refilled carafate as the pharmacy did not give it to her    Discussed her seeing GI. Will try treatment first      Limit tobacco, alcohol, caffeine, chocalate, citrus fruits, recumbency after meals and large portions. Discussed link between PPI's and increased risk of hip, wrist, and spine fractures

## 2025-01-27 DIAGNOSIS — F43.21 GRIEF: ICD-10-CM

## 2025-01-27 DIAGNOSIS — F41.9 ANXIETY: ICD-10-CM

## 2025-01-27 RX ORDER — ALPRAZOLAM 0.5 MG
0.5 TABLET ORAL NIGHTLY PRN
Qty: 15 TABLET | Refills: 0 | Status: SHIPPED | OUTPATIENT
Start: 2025-01-27

## 2025-03-13 ENCOUNTER — OFFICE VISIT (OUTPATIENT)
Dept: FAMILY MEDICINE CLINIC | Facility: CLINIC | Age: 70
End: 2025-03-13
Payer: MEDICARE

## 2025-03-13 VITALS
DIASTOLIC BLOOD PRESSURE: 76 MMHG | HEART RATE: 100 BPM | BODY MASS INDEX: 25.18 KG/M2 | HEIGHT: 67 IN | SYSTOLIC BLOOD PRESSURE: 130 MMHG | WEIGHT: 160.4 LBS | TEMPERATURE: 96.8 F | RESPIRATION RATE: 16 BRPM | OXYGEN SATURATION: 100 %

## 2025-03-13 DIAGNOSIS — F43.21 GRIEF: ICD-10-CM

## 2025-03-13 DIAGNOSIS — E66.3 OVERWEIGHT (BMI 25.0-29.9): ICD-10-CM

## 2025-03-13 DIAGNOSIS — F51.04 CHRONIC INSOMNIA: ICD-10-CM

## 2025-03-13 DIAGNOSIS — M25.50 POLYARTHRALGIA: ICD-10-CM

## 2025-03-13 DIAGNOSIS — K29.00 ACUTE GASTRITIS WITHOUT HEMORRHAGE, UNSPECIFIED GASTRITIS TYPE: ICD-10-CM

## 2025-03-13 DIAGNOSIS — F41.9 ANXIETY: ICD-10-CM

## 2025-03-13 DIAGNOSIS — K21.9 GASTROESOPHAGEAL REFLUX DISEASE WITHOUT ESOPHAGITIS: Primary | ICD-10-CM

## 2025-03-13 PROBLEM — R10.10 PAIN OF UPPER ABDOMEN: Status: RESOLVED | Noted: 2024-07-05 | Resolved: 2025-03-13

## 2025-03-13 RX ORDER — TRAZODONE HYDROCHLORIDE 50 MG/1
50 TABLET ORAL NIGHTLY PRN
Qty: 30 TABLET | Refills: 2 | Status: SHIPPED | OUTPATIENT
Start: 2025-03-13

## 2025-03-13 RX ORDER — PANTOPRAZOLE SODIUM 40 MG/1
40 TABLET, DELAYED RELEASE ORAL DAILY
Qty: 90 TABLET | Refills: 1 | Status: SHIPPED | OUTPATIENT
Start: 2025-03-13 | End: 2025-03-13 | Stop reason: SDUPTHER

## 2025-03-13 RX ORDER — PANTOPRAZOLE SODIUM 40 MG/1
40 TABLET, DELAYED RELEASE ORAL DAILY
Qty: 90 TABLET | Refills: 1 | Status: SHIPPED | OUTPATIENT
Start: 2025-03-13

## 2025-03-13 NOTE — ASSESSMENT & PLAN NOTE
Advised to try taking crestor 3 times a week instead of daily. She said it started after taking it. Pain did not stop with stopping the medication

## 2025-03-13 NOTE — PROGRESS NOTES
History of Present Illness     History provided by: Patient  Limitations to History: None  External Records Reviewed with Brief Summary: Discharge Summary from 25 which showed patient was admitted to the hospital for need for urgent dialysis secondary to shortness of breath and hypertension. She was also noted to have pneumonia at this time and was treated with vancomycin and Zosyn.    HPI:  Stacey Heath is a 54 y.o. female with ESRD on HD Tues/Thurs/Sat, T2DM, HTN, CAD, HFpEF, COPD presenting with chest pain. Patient was on her way to dialysis when she started having chest pressure in the middle of her chest. She denies radiation of the pain. She also notes shortness of breath and a cough that have been persistent since she was admitted to the hospital for pneumonia.      Physical Exam   Triage vitals:  T 36.6 °C (97.8 °F)  HR 79  BP (!) 202/80  RR 18  O2 99 % Supplemental oxygen    General: Awake, alert, in no acute distress  Eyes: Gaze conjugate. EOMI. No scleral icterus or injection.  HENT: Normo-cephalic, atraumatic. No stridor.   CV: Regular rate, regular rhythm. Radial pulses 2+ bilaterally. Palpable AV thrill in RUE. Systolic murmur.  Resp: Breathing non-labored, speaking in full sentences.  Coughing. B/l coarse breath sounds in bases. No wheezing.   GI: Soft, non-distended, non-tender. No rebound or guarding.   MSK/Extremities: No gross bony deformities. Moving all extremities. No edema.   Skin: Warm. Appropriate color.   Neuro: Alert and oriented x3. Face symmetric. Speech is fluent.  Gross strength and sensation intact in b/l UE and Les.  Psych: Appropriate mood and affect      Medical Decision Making & ED Course   Medical Decision Makin y.o. female with ESRD on HD es/Th/Sat, T2DM, HTN, CAD, HFpEF, COPD presenting with chest pain. Patient is non-toxic appearing on initial exam. She is hypertensive with SBP> 200. She is 3 L nasal cannula, but when turned down to zero patient is satting  Subjective   Ana Sandoval is a 69 y.o. female. Presents to [facility]    Chief Complaint   Patient presents with    Anxiety    Hyperlipidemia    Abdominal Pain       Abdominal Pain  This is a new problem. The current episode started more than 1 month ago. The onset quality is gradual. The problem occurs intermittently. The problem has been worse. The pain is located in the epigastric region. The pain is mild. The quality of the pain is burning. The abdominal pain does not radiate. Pertinent negatives include no constipation, diarrhea, frequency, headaches, hematuria, nausea or vomiting. Nothing aggravates the pain. The pain is relieved by Nothing. She has tried antacids for the symptoms. The treatment provided mild relief.   Hyperlipidemia  This is a new problem. Exacerbating diseases include obesity. She has no history of diabetes. Pertinent negatives include no chest pain or shortness of breath. Current antihyperlipidemic treatment includes statins. Risk factors for coronary artery disease include post-menopausal and family history.   Anxiety  Presents for follow-up visit.  Symptoms include depressed mood, insomnia, nervous/anxious behavior and restlessness.  Patient reports no chest pain, excessive worry, irritability, nausea or shortness of breath. The severity of symptoms is moderate. The quality of sleep is fair.        I personally reviewed and updated the patient's allergies, medications, problem list, and past medical, surgical, social, and family history. I have reviewed and confirmed the accuracy of the History of Present Illness and Review of Symptoms as documented by the MA/LPN/RN. May Encinas MD    Allergies:  Allergies   Allergen Reactions    Penicillins Rash and Hives    Tetracycline Hives    Erythromycin Rash       Social History:  Social History     Socioeconomic History    Marital status:    Tobacco Use    Smoking status: Never   Vaping Use    Vaping status: Never  95%. Patient exam is notable for some course breath sounds and bilateral basis. Bedside POCUS performed which shows adequate left ventricular ejection fraction and some B lines bilaterally. Differential diagnosis includes but is not limited to volume overload, SCAPE, ACS, pneumonia, hypertensive urgency versus emergency. Will obtain labs including CBC, CMP, troponin and BNP and chest x-ray. As patient missed dialysis there is also concern for electrolyte abnormalities. As she is hypertensive with systolic's greater than 200, will give patient doses of her home medication and continue to monitor her blood pressure. Patient was recently discharged from the hospital where she was admitted for volume overload in setting of missed dialysis. She had a cardiac workup at this time which was reassuring. At this time there is high clinical suspicion her presentation is secondary to volume overload and the chest pain is more pleuritic and MSK nature.    Her disposition is pending labs and reassessment after her blood pressure has improved.        ED Course:  ED Course as of 02/14/25 0751   Thu Feb 13, 2025   1608 BNP(!): 956  Elevated, but similar to last month.  [AC]   1609 Troponin I, High Sensitivity(!)  Eleavted at 44, but similar to previous. Patient did not have any ischemic changes on EKG when compared to previous. [AC]   1614 BNP(!): 956 [MG]   1614 POTASSIUM: 4.8 [MG]   1614 Troponin I, High Sensitivity (CMC)(!): 44  Not elevated from previous [MG]   1614 CBC and Auto Differential(!)  Hgb with slight drop, just over 1 point. Otherwise unremarkable [MG]   1617 Senior resident supervision note: Patient seen and evaluated with [facility] resident. In brief, patient is a 54-year-old female with past medical history of ESRD on dialysis Tuesday/Thursday/Saturday, T2DM, CAD, HTN, HFpEF, who presented with chest pain and shortness of breath.  Reports somewhat chronic symptoms but acutely worse.  Did reportedly missed dialysis  Used   Substance and Sexual Activity    Alcohol use: Not Currently     Alcohol/week: 3.0 standard drinks of alcohol     Types: 1 Glasses of wine, 1 Cans of beer, 1 Shots of liquor per week     Comment: This is one per week or two of either beer/wine/liquor    Drug use: Never    Sexual activity: Not Currently     Partners: Male     Birth control/protection: Post-menopausal       Family History:  Family History   Problem Relation Age of Onset    Heart disease Mother     Arthritis Mother     COPD Mother     Hyperlipidemia Mother     Stroke Mother         no lasting effects    Thyroid disease Mother         controlled with meds    Heart disease Father     Kidney disease Father     Arthritis Father         Not dibilitating    COPD Father     Hyperlipidemia Father     Vision loss Father         macular degeneration    Hearing loss Father         as he aged    Hearing loss Brother         since young child    Breast cancer Neg Hx     Ovarian cancer Neg Hx        Past Medical History :  Patient Active Problem List   Diagnosis    Acne rosacea    Allergic rhinitis    Chronic insomnia    Menopause    Vitamin D deficiency    Right foot pain    Gastroesophageal reflux disease without esophagitis    Screening for colon cancer    Medicare annual wellness visit, initial    Screening mammogram, encounter for    Anxiety    Overweight (BMI 25.0-29.9)    Pure hypercholesterolemia    Gastritis    Family history of cardiovascular disease    Skin lesion    Grief    Polyarthralgia       Medication List:    Current Outpatient Medications:     Calcium-Vitamin D 600-200 MG-UNIT per tablet, Take  by mouth., Disp: , Rfl:     Cetirizine HCl (ZyrTEC Allergy) 10 MG capsule, Take 1 capsule by mouth Daily., Disp: , Rfl:     cholecalciferol (VITAMIN D3) 25 MCG (1000 UT) tablet, , Disp: , Rfl:     Coenzyme Q10 (COQ10 PO), Take  by mouth., Disp: , Rfl:     Multiple Vitamins-Minerals (PreserVision AREDS 2+Multi Vit) capsule, , Disp: , Rfl:     Omega-3  "1000 MG capsule, , Disp: , Rfl:     pantoprazole (PROTONIX) 40 MG EC tablet, Take 1 tablet by mouth Daily., Disp: 90 tablet, Rfl: 1    Propylene Glycol (SYSTANE COMPLETE OP), , Disp: , Rfl:     rosuvastatin (CRESTOR) 5 MG tablet, TAKE 1 TABLET BY MOUTH EVERY DAY, Disp: 90 tablet, Rfl: 4    traZODone (DESYREL) 50 MG tablet, Take 1 tablet by mouth At Night As Needed for Sleep., Disp: 30 tablet, Rfl: 2    Past Surgical History:  Past Surgical History:   Procedure Laterality Date    CHOLECYSTECTOMY  2003    COLONOSCOPY  2/2/2017    Good for 10 years    FINGER SURGERY  2019    nodule on left pointer finger         Physical Exam:      Vital Signs:    Vitals:    03/13/25 0843   BP: 130/76   Pulse: 100   Resp: 16   Temp: 96.8 °F (36 °C)   SpO2: 100%        /76 (BP Location: Right arm, Patient Position: Sitting, Cuff Size: Adult)   Pulse 100   Temp 96.8 °F (36 °C) (Temporal)   Resp 16   Ht 170.2 cm (67.01\")   Wt 72.8 kg (160 lb 6.4 oz)   SpO2 100%   BMI 25.12 kg/m²     Wt Readings from Last 3 Encounters:   03/13/25 72.8 kg (160 lb 6.4 oz)   11/21/24 70.6 kg (155 lb 9.6 oz)   10/22/24 72.1 kg (159 lb)       Result Review :                Physical Exam  Vitals reviewed.   Constitutional:       Appearance: Normal appearance. She is well-developed.   HENT:      Head: Normocephalic and atraumatic.   Eyes:      General:         Right eye: No discharge.         Left eye: No discharge.   Cardiovascular:      Rate and Rhythm: Normal rate and regular rhythm.      Heart sounds: Normal heart sounds. No murmur heard.     No friction rub. No gallop.   Pulmonary:      Effort: Pulmonary effort is normal. No respiratory distress.      Breath sounds: Normal breath sounds. No wheezing or rales.   Musculoskeletal:      Right shoulder: Crepitus present.      Left shoulder: Crepitus present.      Right knee: Crepitus present.      Left knee: Crepitus present.   Skin:     General: Skin is warm and dry.      Findings: No rash. " today for the sessions but got full session on Tuesday.  Patient does have evidence of fluid overload likely secondary to missed dialysis but exam otherwise generally reassuring.  Screen for other cardiopulmonary cause of symptoms with labs and imaging that were pending at time of signout.  Predominant suspicion is that patient predominately needs dialysis for fluid overload with relatively low pretest probability for ACS otherwise.   [BF]   1655 Troponin I, High Sensitivity (CMC)(!): 43 [MG]      ED Course User Index  [AC] Arturo Greene DO  [BF] Feliciano Mcneal MD  [MG] Nilsa Abbott MD         Diagnoses as of 02/14/25 0751   Chest pain, unspecified type   Subacute cough   Shortness of breath       EKG Independent Interpretation:  EKG obtained at 12:49 independently interpreted by me. It demonstrates normal sinus rhythm with rate of 72. Normal axis. HI interval 142, QRS 80, . No new ST elevations. When compared to previous EKG from 1/6/25, there are no new T-wave inversions or changes in the ST elevations noted in AVR.      The patient was discussed with the following consultants/services: None  ----         Independent Result Review and Interpretation: Relevant laboratory and radiographic results were reviewed and independently interpreted by myself.  As necessary, they are commented on in the ED Course.    Chronic conditions affecting the patient's care: As documented above in MDM    Care Considerations: As documented above in MDM      Disposition   Patient was signed out to Dr. Abbott at 15:00 pending completion of their work-up.  Please see the next provider's transition of care note for the remainder of the patient's care.     Procedures   Procedures    Patient seen and discussed with ED attending physician.    Arturo Greene DO  Emergency Medicine PGY1     Arturo Greene DO  Resident  02/14/25 0751       Neurological:      Mental Status: She is alert and oriented to person, place, and time.      Coordination: Coordination normal.      Gait: Gait normal.   Psychiatric:         Behavior: Behavior is cooperative.         Assessment and Plan:  Problems Addressed this Visit          Endocrine and Metabolic    Overweight (BMI 25.0-29.9)    Patient's (Body mass index is 25.03 kg/m².) indicates that they are overweight with health conditions that include dyslipidemias . Weight is improving with lifestyle modifications. BMI is is above average; BMI management plan is completed. We discussed portion control and increasing exercise.             Gastrointestinal Abdominal     Gastroesophageal reflux disease without esophagitis - Primary    Worse  Referral to [facility]    Limit tobacco, alcohol, caffeine, chocalate, citrus fruits, recumbency after meals and large portions. Discussed link between PPI's and increased risk of hip, wrist, and spine fractures               Relevant Medications    pantoprazole (PROTONIX) 40 MG EC tablet    Other Relevant Orders    Ambulatory Referral to Gastroenterology (Completed)    Gastritis    Relevant Medications    pantoprazole (PROTONIX) 40 MG EC tablet    Other Relevant Orders    Ambulatory Referral to Gastroenterology (Completed)       Mental Health    Anxiety    Worse with loss of sleep  Start trazodone    Diagnosis, treatment and and course discussed. Potential side effects discussed. Return if there is worsening or persistence of symptoms.            Relevant Medications    traZODone (DESYREL) 50 MG tablet    Grief    Relevant Medications    traZODone (DESYREL) 50 MG tablet       Musculoskeletal and Injuries    Polyarthralgia    Advised to try taking crestor 3 times a week instead of daily. She said it started after taking it. Pain did not stop with stopping the medication            Sleep    Chronic insomnia    Relevant Medications    traZODone (DESYREL) 50 MG tablet     Diagnoses          Codes Comments      Gastroesophageal reflux disease without esophagitis    -  Primary ICD-10-CM: K21.9  ICD-9-CM: 530.81       Acute gastritis without hemorrhage, unspecified gastritis type     ICD-10-CM: K29.00  ICD-9-CM: 535.00       Overweight (BMI 25.0-29.9)     ICD-10-CM: E66.3  ICD-9-CM: 278.02       Anxiety     ICD-10-CM: F41.9  ICD-9-CM: 300.00       Grief     ICD-10-CM: F43.21  ICD-9-CM: 309.0       Chronic insomnia     ICD-10-CM: F51.04  ICD-9-CM: 780.52       Polyarthralgia     ICD-10-CM: M25.50  ICD-9-CM: 719.49                          An After Visit Summary and PPPS were given to the patient.       This document is intended for medical expert use only. Reading of this document by patients and/or patient's family without participating medical staff guidance may result in misinterpretation and unintended morbidity. Any interpretation of such data is the responsibility of the patient and/or family member responsible for the patient in concert with their primary or specialist providers, not to be left for sources of online searches such as Omnidrone, [employer] or similar queries. Relying on these approaches to knowledge may result in misinterpretation, misguided goals of care and even death should patients or family members try recommendations outside of the realm of professional medical care.

## 2025-03-23 NOTE — ASSESSMENT & PLAN NOTE
Patient's (Body mass index is 25.03 kg/m².) indicates that they are overweight with health conditions that include dyslipidemias . Weight is improving with lifestyle modifications. BMI is is above average; BMI management plan is completed. We discussed portion control and increasing exercise.

## 2025-03-23 NOTE — ASSESSMENT & PLAN NOTE
Worse  Referral to [facility]    Limit tobacco, alcohol, caffeine, chocalate, citrus fruits, recumbency after meals and large portions. Discussed link between PPI's and increased risk of hip, wrist, and spine fractures

## 2025-03-23 NOTE — ASSESSMENT & PLAN NOTE
Worse with loss of sleep  Start trazodone    Diagnosis, treatment and and course discussed. Potential side effects discussed. Return if there is worsening or persistence of symptoms.

## 2025-04-07 NOTE — PROGRESS NOTES
Subjective   Ana Sandoval is a 69 y.o. female. Presents to [facility]    Chief Complaint   Patient presents with    Anxiety       Anxiety  Presents for follow-up visit.  Symptoms include decreased concentration, depressed mood, dizziness, excessive worry, hyperventilation, insomnia, irritability, nausea, nervous/anxious behavior, panic and restlessness.  Patient reports no chest pain, confusion, dry mouth or palpitations. Symptoms occur most days. The severity of symptoms is moderate. The patient sleeps 7 hours per night. The quality of sleep is fair. Awakens often during the night. Treatments tried: trazodone.      She is grieving. But it comes and goes now. Somewhat better.     I personally reviewed and updated the patient's allergies, medications, problem list, and past medical, surgical, social, and family history. I have reviewed and confirmed the accuracy of the History of Present Illness and Review of Symptoms as documented by the MA/LPN/RN. May Encinas MD    Allergies:  Allergies   Allergen Reactions    Penicillins Rash and Hives    Tetracycline Hives    Erythromycin Rash       Social History:  Social History     Socioeconomic History    Marital status:    Tobacco Use    Smoking status: Never   Vaping Use    Vaping status: Never Used   Substance and Sexual Activity    Alcohol use: Not Currently     Alcohol/week: 3.0 standard drinks of alcohol     Types: 1 Glasses of wine, 1 Cans of beer, 1 Shots of liquor per week     Comment: This is one per week or two of either beer/wine/liquor    Drug use: Never    Sexual activity: Not Currently     Partners: Male     Birth control/protection: Post-menopausal       Family History:  Family History   Problem Relation Age of Onset    Heart disease Mother     Arthritis Mother     COPD Mother     Hyperlipidemia Mother     Stroke Mother         no lasting effects    Thyroid disease Mother         controlled with meds    Heart disease Father      Kidney disease Father     Arthritis Father         Not dibilitating    COPD Father     Hyperlipidemia Father     Vision loss Father         macular degeneration    Hearing loss Father         as he aged    Hearing loss Brother         since young child    Breast cancer Neg Hx     Ovarian cancer Neg Hx        Past Medical History :  Patient Active Problem List   Diagnosis    Acne rosacea    Allergic rhinitis    Chronic insomnia    Menopause    Vitamin D deficiency    Right foot pain    Gastroesophageal reflux disease without esophagitis    Screening for colon cancer    Medicare annual wellness visit, initial    Screening mammogram, encounter for    Anxiety    Overweight (BMI 25.0-29.9)    Pure hypercholesterolemia    Gastritis    Family history of cardiovascular disease    Skin lesion    Grief    Polyarthralgia       Medication List:    Current Outpatient Medications:     Calcium-Vitamin D 600-200 MG-UNIT per tablet, Take  by mouth., Disp: , Rfl:     Cetirizine HCl (ZyrTEC Allergy) 10 MG capsule, Take 1 capsule by mouth Daily., Disp: , Rfl:     cholecalciferol (VITAMIN D3) 25 MCG (1000 UT) tablet, , Disp: , Rfl:     Coenzyme Q10 (COQ10 PO), Take  by mouth., Disp: , Rfl:     fluticasone (FLONASE) 50 MCG/ACT nasal spray, Administer 2 sprays into the nostril(s) as directed by provider Daily., Disp: , Rfl:     Multiple Vitamins-Minerals (PreserVision AREDS 2+Multi Vit) capsule, , Disp: , Rfl:     Omega-3 1000 MG capsule, , Disp: , Rfl:     Propylene Glycol (SYSTANE COMPLETE OP), , Disp: , Rfl:     rosuvastatin (CRESTOR) 5 MG tablet, TAKE 1 TABLET BY MOUTH EVERY DAY, Disp: 90 tablet, Rfl: 4    traZODone (DESYREL) 50 MG tablet, Take 1 tablet by mouth At Night As Needed for Sleep., Disp: 30 tablet, Rfl: 2    Turmeric 500 MG capsule, , Disp: , Rfl:     pantoprazole (PROTONIX) 40 MG EC tablet, Take 1 tablet by mouth Daily., Disp: 90 tablet, Rfl: 1    Past Surgical History:  Past Surgical History:   Procedure Laterality Date  "   CHOLECYSTECTOMY  2003    COLONOSCOPY  2/2/2017    Good for 10 years    FINGER SURGERY  2019    nodule on left pointer finger         Physical Exam:      Vital Signs:    Vitals:    04/14/25 0931   BP: 126/80   Pulse: 120   Resp: 18   Temp: 97.1 °F (36.2 °C)   SpO2: 98%        /80 (BP Location: Right arm, Patient Position: Sitting, Cuff Size: Adult)   Pulse 120   Temp 97.1 °F (36.2 °C) (Temporal)   Resp 18   Ht 170.2 cm (67.01\")   Wt 72.3 kg (159 lb 6.4 oz)   SpO2 98% Comment: ra  BMI 24.96 kg/m²     Wt Readings from Last 3 Encounters:   04/14/25 72.3 kg (159 lb 6.4 oz)   03/13/25 72.8 kg (160 lb 6.4 oz)   11/21/24 70.6 kg (155 lb 9.6 oz)       Result Review :                Physical Exam  Vitals reviewed.   Constitutional:       Appearance: Normal appearance. She is well-developed.   HENT:      Head: Normocephalic and atraumatic.   Eyes:      General:         Right eye: No discharge.         Left eye: No discharge.   Cardiovascular:      Rate and Rhythm: Normal rate and regular rhythm.      Heart sounds: Normal heart sounds. No murmur heard.     No friction rub. No gallop.   Pulmonary:      Effort: Pulmonary effort is normal. No respiratory distress.      Breath sounds: Normal breath sounds. No wheezing or rales.   Skin:     General: Skin is warm and dry.      Findings: No rash.   Neurological:      Mental Status: She is alert and oriented to person, place, and time.      Coordination: Coordination normal.      Gait: Gait normal.   Psychiatric:         Behavior: Behavior is cooperative.         Assessment and Plan:  Problems Addressed this Visit          Endocrine and Metabolic    Overweight (BMI 25.0-29.9)    Patient's (Body mass index is 24.96 kg/m².) indicates that they are overweight with health conditions that include dyslipidemias . Weight is unchanged. BMI is above average; BMI management plan is completed. We discussed low calorie, low carb based diet program, portion control, and increasing " exercise.             Mental Health    Anxiety - Primary    Improving with her current treatment of trazodone  She has support at home  Continue current treatment         Grief    Slowly improving.           Diagnoses         Codes Comments      Anxiety    -  Primary ICD-10-CM: F41.9  ICD-9-CM: 300.00       Overweight (BMI 25.0-29.9)     ICD-10-CM: E66.3  ICD-9-CM: 278.02       Grief     ICD-10-CM: F43.21  ICD-9-CM: 309.0                          An After Visit Summary and PPPS were given to the patient.       This document is intended for medical expert use only. Reading of this document by patients and/or patient's family without participating medical staff guidance may result in misinterpretation and unintended morbidity. Any interpretation of such data is the responsibility of the patient and/or family member responsible for the patient in concert with their primary or specialist providers, not to be left for sources of online searches such as [employer], [employer] or similar queries. Relying on these approaches to knowledge may result in misinterpretation, misguided goals of care and even death should patients or family members try recommendations outside of the realm of professional medical care.

## 2025-04-14 ENCOUNTER — OFFICE VISIT (OUTPATIENT)
Dept: FAMILY MEDICINE CLINIC | Facility: CLINIC | Age: 70
End: 2025-04-14
Payer: MEDICARE

## 2025-04-14 VITALS
BODY MASS INDEX: 25.02 KG/M2 | OXYGEN SATURATION: 98 % | TEMPERATURE: 97.1 F | HEART RATE: 120 BPM | DIASTOLIC BLOOD PRESSURE: 80 MMHG | HEIGHT: 67 IN | WEIGHT: 159.4 LBS | SYSTOLIC BLOOD PRESSURE: 126 MMHG | RESPIRATION RATE: 18 BRPM

## 2025-04-14 DIAGNOSIS — F43.21 GRIEF: ICD-10-CM

## 2025-04-14 DIAGNOSIS — F41.9 ANXIETY: Primary | ICD-10-CM

## 2025-04-14 DIAGNOSIS — E66.3 OVERWEIGHT (BMI 25.0-29.9): ICD-10-CM

## 2025-04-14 RX ORDER — FLUTICASONE PROPIONATE 50 MCG
2 SPRAY, SUSPENSION (ML) NASAL DAILY
COMMUNITY

## 2025-04-14 RX ORDER — VIT C/B6/B5/MAGNESIUM/HERB 173 50-5-6-5MG
CAPSULE ORAL
COMMUNITY
Start: 2025-02-25

## 2025-04-21 DIAGNOSIS — K29.00 ACUTE GASTRITIS WITHOUT HEMORRHAGE, UNSPECIFIED GASTRITIS TYPE: ICD-10-CM

## 2025-04-22 RX ORDER — PANTOPRAZOLE SODIUM 40 MG/1
40 TABLET, DELAYED RELEASE ORAL DAILY
Qty: 90 TABLET | Refills: 1 | Status: SHIPPED | OUTPATIENT
Start: 2025-04-22

## 2025-04-24 NOTE — ASSESSMENT & PLAN NOTE
Improving with her current treatment of trazodone  She has support at home  Continue current treatment

## 2025-04-24 NOTE — ASSESSMENT & PLAN NOTE
Patient's (Body mass index is 24.96 kg/m².) indicates that they are overweight with health conditions that include dyslipidemias . Weight is unchanged. BMI is above average; BMI management plan is completed. We discussed low calorie, low carb based diet program, portion control, and increasing exercise.

## 2025-06-12 ENCOUNTER — OFFICE (OUTPATIENT)
Dept: URBAN - METROPOLITAN AREA CLINIC 64 | Facility: CLINIC | Age: 70
End: 2025-06-12
Payer: MEDICARE

## 2025-06-12 VITALS
SYSTOLIC BLOOD PRESSURE: 108 MMHG | DIASTOLIC BLOOD PRESSURE: 78 MMHG | HEART RATE: 78 BPM | HEIGHT: 67 IN | WEIGHT: 162 LBS

## 2025-06-12 DIAGNOSIS — K30 FUNCTIONAL DYSPEPSIA: ICD-10-CM

## 2025-06-12 DIAGNOSIS — R93.3 ABNORMAL FINDINGS ON DIAGNOSTIC IMAGING OF OTHER PARTS OF DI: ICD-10-CM

## 2025-06-12 DIAGNOSIS — R10.13 EPIGASTRIC PAIN: ICD-10-CM

## 2025-06-12 DIAGNOSIS — R13.10 DYSPHAGIA, UNSPECIFIED: ICD-10-CM

## 2025-06-12 PROCEDURE — 99204 OFFICE O/P NEW MOD 45 MIN: CPT | Performed by: INTERNAL MEDICINE

## 2025-07-01 ENCOUNTER — OFFICE (AMBULATORY)
Dept: URBAN - METROPOLITAN AREA PATHOLOGY 19 | Facility: PATHOLOGY | Age: 70
End: 2025-07-01
Payer: COMMERCIAL

## 2025-07-01 ENCOUNTER — ON CAMPUS - OUTPATIENT (AMBULATORY)
Dept: URBAN - METROPOLITAN AREA HOSPITAL 2 | Facility: HOSPITAL | Age: 70
End: 2025-07-01
Payer: MEDICARE

## 2025-07-01 ENCOUNTER — OFFICE (AMBULATORY)
Dept: URBAN - METROPOLITAN AREA PATHOLOGY 19 | Facility: PATHOLOGY | Age: 70
End: 2025-07-01
Payer: MEDICARE

## 2025-07-01 VITALS
RESPIRATION RATE: 19 BRPM | HEART RATE: 92 BPM | SYSTOLIC BLOOD PRESSURE: 116 MMHG | WEIGHT: 162 LBS | DIASTOLIC BLOOD PRESSURE: 92 MMHG | SYSTOLIC BLOOD PRESSURE: 114 MMHG | HEART RATE: 74 BPM | HEART RATE: 75 BPM | RESPIRATION RATE: 18 BRPM | DIASTOLIC BLOOD PRESSURE: 85 MMHG | HEART RATE: 96 BPM | RESPIRATION RATE: 17 BRPM | SYSTOLIC BLOOD PRESSURE: 155 MMHG | DIASTOLIC BLOOD PRESSURE: 78 MMHG | SYSTOLIC BLOOD PRESSURE: 133 MMHG | DIASTOLIC BLOOD PRESSURE: 56 MMHG | HEIGHT: 67 IN | OXYGEN SATURATION: 100 % | HEART RATE: 80 BPM | OXYGEN SATURATION: 98 % | SYSTOLIC BLOOD PRESSURE: 140 MMHG | OXYGEN SATURATION: 99 % | DIASTOLIC BLOOD PRESSURE: 58 MMHG | TEMPERATURE: 95.9 F

## 2025-07-01 DIAGNOSIS — K31.89 OTHER DISEASES OF STOMACH AND DUODENUM: ICD-10-CM

## 2025-07-01 DIAGNOSIS — K31.7 POLYP OF STOMACH AND DUODENUM: ICD-10-CM

## 2025-07-01 DIAGNOSIS — R13.10 DYSPHAGIA, UNSPECIFIED: ICD-10-CM

## 2025-07-01 DIAGNOSIS — K22.2 ESOPHAGEAL OBSTRUCTION: ICD-10-CM

## 2025-07-01 DIAGNOSIS — K29.70 GASTRITIS, UNSPECIFIED, WITHOUT BLEEDING: ICD-10-CM

## 2025-07-01 PROCEDURE — 43450 DILATE ESOPHAGUS 1/MULT PASS: CPT | Performed by: INTERNAL MEDICINE

## 2025-07-01 PROCEDURE — 88305 TISSUE EXAM BY PATHOLOGIST: CPT | Mod: 26 | Performed by: PATHOLOGY

## 2025-07-01 PROCEDURE — 43239 EGD BIOPSY SINGLE/MULTIPLE: CPT | Performed by: INTERNAL MEDICINE

## 2025-07-14 NOTE — PROGRESS NOTES
Subsequent Medicare Wellness Visit    Subjective    History of Present Illness:  Ana Sandoval is a 70 y.o. female who presents for a Subsequent Medicare Wellness Visit.    The following portions of the patient's history were reviewed and   updated as appropriate: allergies, current medications, past family history, past medical history, past social history, past surgical history, and problem list.  Family History   Problem Relation Age of Onset    Heart disease Mother     Arthritis Mother     COPD Mother     Hyperlipidemia Mother     Stroke Mother         no lasting effects    Thyroid disease Mother         controlled with meds    Heart disease Father     Kidney disease Father     Arthritis Father         Not dibilitating    COPD Father     Hyperlipidemia Father     Vision loss Father         macular degeneration    Hearing loss Father         as he aged    Hearing loss Brother         since young child    Breast cancer Neg Hx     Ovarian cancer Neg Hx      Past Surgical History:   Procedure Laterality Date    CHOLECYSTECTOMY  2003    COLONOSCOPY  2/2/2017    Good for 10 years    FINGER SURGERY  2019    nodule on left pointer finger        Compared to one year ago, the patient feels her physical   health is the same.    Compared to one year ago, the patient feels her mental   health is the same.    Recent Hospitalizations:  She was not admitted to the hospital during the last year.       Current Medical Providers:  Patient Care Team:  May Encinas MD as PCP - General (Family Medicine)    Outpatient Medications Prior to Visit   Medication Sig Dispense Refill    Calcium-Vitamin D 600-200 MG-UNIT per tablet Take  by mouth.      Cetirizine HCl (ZyrTEC Allergy) 10 MG capsule Take 1 capsule by mouth Daily.      cholecalciferol (VITAMIN D3) 25 MCG (1000 UT) tablet       Coenzyme Q10 (COQ10 PO) Take  by mouth.      fluticasone (FLONASE) 50 MCG/ACT nasal spray Administer 2 sprays into the nostril(s) as directed by  provider Daily.      Multiple Vitamins-Minerals (PreserVision AREDS 2+Multi Vit) capsule       Omega-3 1000 MG capsule       pantoprazole (PROTONIX) 40 MG EC tablet Take 1 tablet by mouth Daily. 90 tablet 1    Propylene Glycol (SYSTANE COMPLETE OP)       rosuvastatin (CRESTOR) 5 MG tablet TAKE 1 TABLET BY MOUTH EVERY DAY 90 tablet 4    sucralfate (CARAFATE) 1 g tablet 0      traZODone (DESYREL) 50 MG tablet Take 1 tablet by mouth At Night As Needed for Sleep. 30 tablet 2    Turmeric 500 MG capsule        No facility-administered medications prior to visit.     Pain Score    07/17/25 0856   PainSc: 0-No pain      No opioid medication identified on active medication list. I have reviewed chart for other potential  high risk medication/s and harmful drug interactions in the elderly.        Aspirin is not on active medication list.  Aspirin use is not indicated based on review of current medical condition/s. Risk of harm outweighs potential benefits.  .    Patient Active Problem List   Diagnosis    Acne rosacea    Allergic rhinitis    Chronic insomnia    Asymptomatic menopausal state    Vitamin D deficiency    Right foot pain    Gastroesophageal reflux disease without esophagitis    Screening for colon cancer    Medicare annual wellness visit, initial    Screening mammogram, encounter for    Anxiety    Overweight (BMI 25.0-29.9)    Pure hypercholesterolemia    Gastritis    Family history of cardiovascular disease    Skin lesion    Grief    Polyarthralgia     Advance Care Planning  Advance Directive is on file.  ACP discussion was held with the patient during this visit. Patient has an advance directive in EMR which is still valid.   No changes and no discussion       Objective    Vitals:    07/17/25 0856   BP: 122/70   BP Location: Right arm   Patient Position: Sitting   Cuff Size: Adult   Pulse: 105   Resp: 18   Temp: 97.7 °F (36.5 °C)   TempSrc: Temporal   SpO2: 98%  Comment: ra   Weight: 73.2 kg (161 lb 6.4 oz)  "  Height: 170.2 cm (67.01\")   PainSc: 0-No pain       Does the patient have evidence of cognitive impairment? No           HEALTH RISK ASSESSMENT    Smoking Status:  Social History     Tobacco Use   Smoking Status Never   Smokeless Tobacco Not on file     Alcohol Consumption:  Social History     Substance and Sexual Activity   Alcohol Use Not Currently    Alcohol/week: 3.0 standard drinks of alcohol    Types: 1 Glasses of wine, 1 Cans of beer, 1 Shots of liquor per week    Comment: This is one per week or two of either beer/wine/liquor     Fall Risk Screen:    EMORY Fall Risk Assessment was completed, and patient is at LOW risk for falls.Assessment completed on:2025    Depression Screenin/17/2025     8:00 AM   PHQ-2/PHQ-9 Depression Screening   Little interest or pleasure in doing things Not at all   Feeling down, depressed, or hopeless Several days   How difficult have these problems made it for you to do your work, take care of things at home, or get along with other people? Not difficult at all       Health Habits and Functional and Cognitive Screenin/14/2025     6:21 PM   Functional & Cognitive Status   Do you have difficulty preparing food and eating? No   Do you have difficulty bathing yourself, getting dressed or grooming yourself? No   Do you have difficulty using the toilet? No   Do you have difficulty moving around from place to place? No   Do you have trouble with steps or getting out of a bed or a chair? No   Current Diet Unhealthy Diet   Dental Exam Up to date   Eye Exam Up to date   Exercise (times per week) 5 times per week   Current Exercises Include Aerobics;Cardiovascular Workout;Gardening;Home Exercise Program (TV, Computer, Etc.);House Cleaning;Walking;Yard Work   Do you need help using the phone?  No   Are you deaf or do you have serious difficulty hearing?  No   Do you need help to go to places out of walking distance? No   Do you need help shopping? No   Do you need " help preparing meals?  No   Do you need help with housework?  No   Do you need help with laundry? No   Do you need help taking your medications? No   Do you need help managing money? No   Do you ever drive or ride in a car without wearing a seat belt? No   Have you felt unusual fatigue (could be tiredness), stress, anger or loneliness in the last month? Yes   Who do you live with? Alone   If you need help, do you have trouble finding someone available to you? No   Have you been bothered in the last four weeks by sexual problems? No   Do you have difficulty concentrating, remembering or making decisions? Yes       Age-appropriate Screening Schedule:  Refer to the list below for future screening recommendations based on patient's age, sex and/or medical conditions. Orders for these recommended tests are listed in the plan section. The patient has been provided with a written plan.    Health Maintenance   Topic Date Due    ZOSTER VACCINE (3 of 3) 02/03/2023    COVID-19 Vaccine (6 - 2024-25 season) 09/01/2024    DXA SCAN  08/04/2025    LIPID PANEL  08/28/2025    INFLUENZA VACCINE  10/01/2025    ANNUAL WELLNESS VISIT  07/17/2026    MAMMOGRAM  08/07/2026    COLORECTAL CANCER SCREENING  02/02/2027    TDAP/TD VACCINES (5 - Td or Tdap) 07/03/2031    HEPATITIS C SCREENING  Completed    Pneumococcal Vaccine 50+  Completed              Assessment & Plan   Medically necessary, significant, and separately identifiable medical problems identified during this visit are addressed on a separate visit note.    CMS Preventative Services Quick Reference  Risk Factors Identified During Encounter  None Identified  The above risks/problems have been discussed with the patient.  Follow up actions/plans if indicated are seen below in the Assessment/Plan Section.  Pertinent information has been shared with the patient in the After Visit Summary.    Follow Up:   No follow-ups on file.     An After Visit Summary and PPPS were made available to  the patient.    Medicare Wellness  Personal Prevention Plan of Service     Date of Office Visit:  2025  Encounter Provider:  May Encians MD  Place of Service:  [facility]  Patient Name: Ana Sandoval  :  1955    As part of the Medicare Wellness portion of your visit today, we are providing you with this personalized preventive plan of services (PPPS). This plan is based upon recommendations of the [place] Preventive Services Task Force (USPSTF) and the Advisory Committee on Immunization Practices (ACIP).    This lists the preventive care services that should be considered, and provides dates of when you are due. Items listed as completed are up-to-date and do not require any further intervention.    Health Maintenance   Topic Date Due    ZOSTER VACCINE (3 of 3) 2023    COVID-19 Vaccine ( season) 2024    DXA SCAN  2025    LIPID PANEL  2025    INFLUENZA VACCINE  10/01/2025    ANNUAL WELLNESS VISIT  2026    MAMMOGRAM  2026    COLORECTAL CANCER SCREENING  2027    TDAP/TD VACCINES (5 - Td or Tdap) 2031    HEPATITIS C SCREENING  Completed    Pneumococcal Vaccine 50+  Completed       Orders Placed This Encounter   Procedures    Mammo Screening Digital Tomosynthesis Bilateral With CAD     Standing Status:   Future     Expected Date:   2025     Expiration Date:   2026     Reason for Exam::   screening     Release to patient:   Routine Release [[id number]]    DEXA Bone Density Axial     Standing Status:   Future     Expected Date:   2025     Expiration Date:   2026     Reason for Exam::   screen     Release to patient:   Routine Release [[id number]]     Does this patient have a diabetic monitoring/medication delivering device on?:   No     Is patient taking or have taken long term Glucocorticoid (steroids)?:   No     Does the patient have rheumatoid arthritis?:   No     Does the patient  have secondary osteoporosis?:   No    XR Ankle 3+ View Left     Reason for Exam::   gives out, midl pain lateral mallelous     Release to patient:   Routine Release [[id number]]    Comprehensive Metabolic Panel     Release to patient:   Routine Release [[id number]]    Lipid Panel With / Chol / HDL Ratio     Release to patient:   Routine Release [[id number]]    TSH     Release to patient:   Routine Release [[id number]]    Ambulatory Referral to Podiatry     Referral Priority:   Routine     Referral Type:   Consultation     Referral Reason:   Specialty Services Required     Referred to Provider:   Saul Sánchez DPM     Requested Specialty:   Podiatry     Number of Visits Requested:   1    CBC & Differential     Manual Differential:   No     Release to patient:   Routine Release [[id number]]       No follow-ups on file.  Sit-to-Stand Exercise    The sit-to-stand exercise (also known as the chair stand or chair rise exercise) strengthens your lower body and helps you maintain or improve your mobility and independence. The goal is to do the sit-to-stand exercise without using your hands. This will be easier as you become stronger. You should always talk with your health care provider before starting any exercise program, especially if you have had recent surgery.  Do the exercise exactly as told by your health care provider and adjust it as directed. It is normal to feel mild stretching, pulling, tightness, or discomfort as you do this exercise, but you should stop right away if you feel sudden pain or your pain gets worse. Do not begin doing this exercise until told by your health care provider.  What the sit-to-stand exercise does  The sit-to-stand exercise helps to strengthen the muscles in your thighs and the muscles in the center of your body that give you stability (core muscles). This exercise is especially helpful if:  You have had knee or hip surgery.  You have trouble getting up from a chair, out of a  car, or off the toilet.  How to do the sit-to-stand exercise  Sit toward the front edge of a sturdy chair without armrests. Your knees should be bent and your feet should be flat on the floor and shoulder-width apart.  Place your hands lightly on each side of the seat. Keep your back and neck as straight as possible, with your chest slightly forward.  Breathe in slowly. Lean forward and slightly shift your weight to the front of your feet.  Breathe out as you slowly stand up. Use your hands as little as possible.  Stand and pause for a full breath in and out.  Breathe in as you sit down slowly. Tighten your core and abdominal muscles to control your lowering as much as possible.  Breathe out slowly.  Do this exercise 10-15 times. If needed, do it fewer times until you build up strength.  Rest for 1 minute, then do another set of 10-15 repetitions.  To change the difficulty of the sit-to-stand exercise  If the exercise is too difficult, use a chair with sturdy armrests, and push off the armrests to help you come to the standing position. You can also use the armrests to help slowly lower yourself back to sitting. As this gets easier, try to use your arms less. You can also place a firm cushion or pillow on the chair to make the surface higher.  If this exercise is too easy, do not use your arms to help raise or lower yourself. You can also wear a weighted vest, use hand weights, increase your repetitions, or try a lower chair.  General tips  You may feel tired when starting an exercise routine. This is normal.  You may have muscle soreness that lasts a few days. This is normal. As you get stronger, you may not feel muscle soreness.  Use smooth, steady movements.  Do not  hold your breath during strength exercises. This can cause unsafe changes in your blood pressure.  Breathe in slowly through your nose, and breathe out slowly through your mouth.  Summary  Strengthening your lower body is an important step to help  you move safely and independently.  The sit-to-stand exercise helps strengthen the muscles in your thighs and core.  You should always talk with your health care provider before starting any exercise program, especially if you have had recent surgery.  This information is not intended to replace advice given to you by your health care provider. Make sure you discuss any questions you have with your health care provider.  Document Revised: 10/16/2019 Document Reviewed: 02/08/2018  Elsevier Patient Education © 2021 [employer]    Advance Care Planning and Advance Directives     You make decisions on a daily basis - decisions about where you want to live, your career, your home, your life. Perhaps one of the most important decisions you face is your choice for future medical care. Take time to talk with your family and your healthcare team and start planning today.  Advance Care Planning is a process that can help you:  Understand possible future healthcare decisions in light of your own experiences  Reflect on those decision in light of your goals and values  Discuss your decisions with those closest to you and the healthcare professionals that care for you  Make a plan by creating a document that reflects your wishes    Surrogate Decision Maker  In the event of a medical emergency, which has left you unable to communicate or to make your own decisions, you would need someone to make decisions for you.  It is important to discuss your preferences for medical treatment with this person while you are in good health.     Qualities of a surrogate decision maker:  Willing to take on this role and responsibility  Knows what you want for future medical care  Willing to follow your wishes even if they don't agree with them  Able to make difficult medical decisions under stressful circumstances    Advance Directives  These are legal documents you can create that will guide your healthcare team and decision maker(s) when  needed. These documents can be stored in the electronic medical record.    Living Will - a legal document to guide your care if you have a terminal condition or a serious illness and are unable to communicate. States vary by statute in document names/types, but most forms may include one or more of the following:        -  Directions regarding life-prolonging treatments        -  Directions regarding artificially provided nutrition/hydration        -  Choosing a healthcare decision maker        -  Direction regarding organ/tissue donation    Durable Power of  for Healthcare - this document names an -in-fact to make medical decisions for you, but it may also allow this person to make personal and financial decisions for you. Please seek the advice of an  if you need this type of document.    **Advance Directives are not required and no one may discriminate against you if you do not sign one.    Medical Orders  Many states allow specific forms/orders signed by your physician to record your wishes for medical treatment in your current state of health. This form, signed in personal communication with your physician, addresses resuscitation and other medical interventions that you may or may not want.  For more information or to schedule a time with a [facility] Advance Care Planning Facilitator contact: [email or web address] or call [phone] and someone will contact you directly.    Fall Prevention in the Home, Adult  Falls can cause injuries and can happen to people of all ages. There are many things you can do to make your home safe and to help prevent falls. Ask for help when making these changes.  What actions can I take to prevent falls?  General Instructions  Use good lighting in all rooms. Replace any light bulbs that burn out.  Turn on the lights in dark areas. Use night-lights.  Keep items that you use often in easy-to-reach places. Lower the shelves around your home if  needed.  Set up your furniture so you have a clear path. Avoid moving your furniture around.  Do not have throw rugs or other things on the floor that can make you trip.  Avoid walking on wet floors.  If any of your floors are uneven, fix them.  Add color or contrast paint or tape to clearly mary and help you see:  Grab bars or handrails.  First and last steps of staircases.  Where the edge of each step is.  If you use a stepladder:  Make sure that it is fully opened. Do not climb a closed stepladder.  Make sure the sides of the stepladder are locked in place.  Ask someone to hold the stepladder while you use it.  Know where your pets are when moving through your home.  What can I do in the bathroom?         Keep the floor dry. Clean up any water on the floor right away.  Remove soap buildup in the tub or shower.  Use nonskid mats or decals on the floor of the tub or shower.  Attach bath mats securely with double-sided, nonslip rug tape.  If you need to sit down in the shower, use a plastic, nonslip stool.  Install grab bars by the toilet and in the tub and shower. Do not use towel bars as grab bars.  What can I do in the bedroom?  Make sure that you have a light by your bed that is easy to reach.  Do not use any sheets or blankets for your bed that hang to the floor.  Have a firm chair with side arms that you can use for support when you get dressed.  What can I do in the kitchen?  Clean up any spills right away.  If you need to reach something above you, use a step stool with a grab bar.  Keep electrical cords out of the way.  Do not use floor polish or wax that makes floors slippery.  What can I do with my stairs?  Do not leave any items on the stairs.  Make sure that you have a light switch at the top and the bottom of the stairs.  Make sure that there are handrails on both sides of the stairs. Fix handrails that are broken or loose.  Install nonslip stair treads on all your stairs.  Avoid having throw rugs at  the top or bottom of the stairs.  Choose a carpet that does not hide the edge of the steps on the stairs.  Check carpeting to make sure that it is firmly attached to the stairs. Fix carpet that is loose or worn.  What can I do on the outside of my home?  Use bright outdoor lighting.  Fix the edges of walkways and driveways and fix any cracks.  Remove anything that might make you trip as you walk through a door, such as a raised step or threshold.  Trim any bushes or trees on paths to your home.  Check to see if handrails are loose or broken and that both sides of all steps have handrails.  Install guardrails along the edges of any raised decks and porches.  Clear paths of anything that can make you trip, such as tools or rocks.  Have leaves, snow, or ice cleared regularly.  Use sand or salt on paths during winter.  Clean up any spills in your garage right away. This includes grease or oil spills.  What other actions can I take?  Wear shoes that:  Have a low heel. Do not wear high heels.  Have rubber bottoms.  Feel good on your feet and fit well.  Are closed at the toe. Do not wear open-toe sandals.  Use tools that help you move around if needed. These include:  Canes.  Walkers.  Scooters.  Crutches.  Review your medicines with your doctor. Some medicines can make you feel dizzy. This can increase your chance of falling.  Ask your doctor what else you can do to help prevent falls.  Where to find more information  Centers for Disease Control and Prevention, STEADI: www.cdc.gov  [employer] [place] on Aging: www.carrie.nih.gov  Contact a doctor if:  You are afraid of falling at home.  You feel weak, drowsy, or dizzy at home.  You fall at home.  Summary  There are many simple things that you can do to make your home safe and to help prevent falls.  Ways to make your home safe include removing things that can make you trip and installing grab bars in the bathroom.  Ask for help when making these changes in your home.  This  information is not intended to replace advice given to you by your health care provider. Make sure you discuss any questions you have with your health care provider.  Document Revised: 07/21/2021 Document Reviewed: 07/21/2021  Elsevier Patient Education © 2021 [employer]         Additional E&M Note during same encounter follows:  Patient has additional, significant, and separately identifiable condition(s)/problem(s) that require work above and beyond the Medicare Wellness Visit       Objective     Subjective   Ana Sandoval is here for:    Chief Complaint   Patient presents with    Medicare Wellness-subsequent    Hyperlipidemia    Ankle Pain       Subjective   Ana is also being seen today for an annual adult preventative physical exam.  and Ana is also being seen today for additional medical problem/s.    Hyperlipidemia  This is a chronic problem. The current episode started more than 1 year ago. She has no history of diabetes or obesity. Pertinent negatives include no chest pain or shortness of breath. Current antihyperlipidemic treatment includes statins and herbal therapy. The current treatment provides moderate improvement of lipids. Risk factors for coronary artery disease include dyslipidemia, post-menopausal and family history.   Ankle Pain   The incident occurred more than 1 week ago (several years). There was no injury mechanism. The pain is present in the left ankle. The pain is at a severity of 0/10. The patient is experiencing no pain. Pertinent negatives include no numbness or tingling. Associated symptoms comments: Ankle gives out . Popping sensation. She reports no foreign bodies present. Nothing aggravates the symptoms. She has tried nothing for the symptoms.             Physical Exam:  Review of Systems   Respiratory:  Negative for shortness of breath.    Cardiovascular:  Negative for chest pain.   Neurological:  Negative for tingling and numbness.        Vitals:    07/17/25 0856   BP:  122/70   Pulse: 105   Resp: 18   Temp: 97.7 °F (36.5 °C)   SpO2: 98%       Physical Exam  Vitals and nursing note reviewed.   Constitutional:       General: She is not in acute distress.     Appearance: She is well-developed. She is not diaphoretic.   HENT:      Head: Normocephalic and atraumatic.      Right Ear: Tympanic membrane and external ear normal.      Left Ear: Tympanic membrane and external ear normal.      Nose: Nose normal.      Mouth/Throat:      Pharynx: No oropharyngeal exudate.   Eyes:      General: No scleral icterus.        Right eye: No discharge.         Left eye: No discharge.      Conjunctiva/sclera: Conjunctivae normal.      Pupils: Pupils are equal, round, and reactive to light.   Neck:      Thyroid: No thyromegaly.      Trachea: No tracheal deviation.   Cardiovascular:      Rate and Rhythm: Normal rate and regular rhythm.      Heart sounds: Normal heart sounds. No murmur heard.     No friction rub. No gallop.   Pulmonary:      Effort: Pulmonary effort is normal. No respiratory distress.      Breath sounds: Normal breath sounds. No stridor. No wheezing or rales.   Abdominal:      General: Bowel sounds are normal. There is no distension.      Palpations: Abdomen is soft. There is no mass.      Tenderness: There is no abdominal tenderness. There is no guarding or rebound.   Musculoskeletal:         General: No deformity. Normal range of motion.      Cervical back: Normal range of motion and neck supple.      Left ankle: No swelling or ecchymosis. Tenderness present over the lateral malleolus. Normal range of motion. Anterior drawer test negative.   Lymphadenopathy:      Cervical: No cervical adenopathy.   Skin:     General: Skin is warm and dry.      Capillary Refill: Capillary refill takes less than 2 seconds.      Coloration: Skin is not pale.      Findings: No erythema or rash.   Neurological:      Mental Status: She is alert and oriented to person, place, and time.      Cranial Nerves: No  cranial nerve deficit.      Sensory: No sensory deficit.      Motor: No tremor, atrophy or abnormal muscle tone.      Coordination: Coordination normal.      Gait: Gait normal.      Deep Tendon Reflexes: Reflexes are normal and symmetric. Reflexes normal.   Psychiatric:         Behavior: Behavior normal.         Thought Content: Thought content normal.         Cognition and Memory: Memory is not impaired. She does not exhibit impaired recent memory or impaired remote memory.         Judgment: Judgment normal.         Result Review :            Assessment and Plan:  Problem List Items Addressed This Visit          Cardiac and Vasculature    Pure hypercholesterolemia    Current Assessment & Plan    She is on crestor  Continue current treatment  Will check labs         Relevant Orders    Lipid Panel With / Chol / HDL Ratio       Endocrine and Metabolic    Overweight (BMI 25.0-29.9)       Genitourinary and Reproductive     Asymptomatic menopausal state    Relevant Orders    DEXA Bone Density Axial     Other Visit Diagnoses         Medicare annual wellness visit, subsequent    -  Primary    Relevant Orders    CBC & Differential    Comprehensive Metabolic Panel    TSH      Chronic pain of left ankle        worsening  Will get xray    Relevant Orders    XR Ankle 3+ View Left    Ambulatory Referral to Podiatry (Completed)      Screening mammogram for breast cancer        Relevant Orders    Mammo Screening Digital Tomosynthesis Bilateral With CAD            BMI is within normal parameters. No other follow-up for BMI required.             Assessment and Plan Additional age appropriate preventative wellness advice topics were discussed during today's preventative wellness exam(some topics already addressed during AWV portion of the note above):    Physical Activity: Advised cardiovascular activity 150 minutes per week as tolerated. (example brisk walk for 30 minutes, 5 days a week).   Nutrition: Discussed nutrition plan with  patient. Information shared in after visit summary. Goal is for a well balanced diet to enhance overall health.   Healthy Weight: Discussed current and goal BMI with patient. Steps to attain this goal discussed. Information shared in after visit summary.                      Follow Up   No follow-ups on file.  Patient was given instructions and counseling regarding his condition or for health maintenance advice. Please see specific information pulled into the AVS if appropriate.

## 2025-07-17 ENCOUNTER — HOSPITAL ENCOUNTER (OUTPATIENT)
Dept: GENERAL RADIOLOGY | Facility: HOSPITAL | Age: 70
Discharge: HOME | End: 2025-07-17
Admitting: FAMILY MEDICINE
Payer: MEDICARE

## 2025-07-17 ENCOUNTER — OFFICE VISIT (OUTPATIENT)
Dept: FAMILY MEDICINE CLINIC | Facility: CLINIC | Age: 70
End: 2025-07-17
Payer: MEDICARE

## 2025-07-17 VITALS
HEART RATE: 105 BPM | TEMPERATURE: 97.7 F | WEIGHT: 161.4 LBS | HEIGHT: 67 IN | OXYGEN SATURATION: 98 % | BODY MASS INDEX: 25.33 KG/M2 | RESPIRATION RATE: 18 BRPM | SYSTOLIC BLOOD PRESSURE: 122 MMHG | DIASTOLIC BLOOD PRESSURE: 70 MMHG

## 2025-07-17 DIAGNOSIS — Z00.00 MEDICARE ANNUAL WELLNESS VISIT, SUBSEQUENT: Primary | ICD-10-CM

## 2025-07-17 DIAGNOSIS — Z78.0 ASYMPTOMATIC MENOPAUSAL STATE: ICD-10-CM

## 2025-07-17 DIAGNOSIS — Z12.31 SCREENING MAMMOGRAM FOR BREAST CANCER: ICD-10-CM

## 2025-07-17 DIAGNOSIS — E66.3 OVERWEIGHT (BMI 25.0-29.9): ICD-10-CM

## 2025-07-17 DIAGNOSIS — E78.00 PURE HYPERCHOLESTEROLEMIA: ICD-10-CM

## 2025-07-17 DIAGNOSIS — M25.572 CHRONIC PAIN OF LEFT ANKLE: ICD-10-CM

## 2025-07-17 DIAGNOSIS — G89.29 CHRONIC PAIN OF LEFT ANKLE: ICD-10-CM

## 2025-07-17 PROCEDURE — 73610 X-RAY EXAM OF ANKLE: CPT

## 2025-07-17 RX ORDER — SUCRALFATE 1 G/1
TABLET ORAL
COMMUNITY

## 2025-07-25 ENCOUNTER — PATIENT MESSAGE (OUTPATIENT)
Dept: FAMILY MEDICINE CLINIC | Facility: CLINIC | Age: 70
End: 2025-07-25
Payer: COMMERCIAL

## 2025-07-28 LAB
NCCN CRITERIA FLAG: NORMAL
TYRER CUZICK SCORE: 6.3

## 2025-07-29 LAB
ALBUMIN SERPL-MCNC: 4.5 G/DL (ref 3.9–4.9)
ALP SERPL-CCNC: 90 IU/L (ref 44–121)
ALT SERPL-CCNC: 20 IU/L (ref 0–32)
AST SERPL-CCNC: 27 IU/L (ref 0–40)
BASOPHILS # BLD AUTO: 0 X10E3/UL (ref 0–0.2)
BASOPHILS NFR BLD AUTO: 0 %
BILIRUB SERPL-MCNC: 0.5 MG/DL (ref 0–1.2)
BUN SERPL-MCNC: 12 MG/DL (ref 8–27)
BUN/CREAT SERPL: 12 (ref 12–28)
CALCIUM SERPL-MCNC: 9.6 MG/DL (ref 8.7–10.3)
CHLORIDE SERPL-SCNC: 104 MMOL/L (ref 96–106)
CHOLEST SERPL-MCNC: 177 MG/DL (ref 100–199)
CHOLEST/HDLC SERPL: 2 RATIO (ref 0–4.4)
CO2 SERPL-SCNC: 23 MMOL/L (ref 20–29)
CREAT SERPL-MCNC: 1 MG/DL (ref 0.57–1)
EGFRCR SERPLBLD CKD-EPI 2021: 61 ML/MIN/1.73
EOSINOPHIL # BLD AUTO: 0.1 X10E3/UL (ref 0–0.4)
EOSINOPHIL NFR BLD AUTO: 2 %
ERYTHROCYTE [DISTWIDTH] IN BLOOD BY AUTOMATED COUNT: 13.1 % (ref 11.7–15.4)
GLOBULIN SER CALC-MCNC: 2.5 G/DL (ref 1.5–4.5)
GLUCOSE SERPL-MCNC: 93 MG/DL (ref 70–99)
HCT VFR BLD AUTO: 45.3 % (ref 34–46.6)
HDLC SERPL-MCNC: 89 MG/DL
HGB BLD-MCNC: 14.6 G/DL (ref 11.1–15.9)
IMM GRANULOCYTES # BLD AUTO: 0 X10E3/UL (ref 0–0.1)
IMM GRANULOCYTES NFR BLD AUTO: 0 %
LDLC SERPL CALC-MCNC: 69 MG/DL (ref 0–99)
LYMPHOCYTES # BLD AUTO: 1.9 X10E3/UL (ref 0.7–3.1)
LYMPHOCYTES NFR BLD AUTO: 36 %
MCH RBC QN AUTO: 29.3 PG (ref 26.6–33)
MCHC RBC AUTO-ENTMCNC: 32.2 G/DL (ref 31.5–35.7)
MCV RBC AUTO: 91 FL (ref 79–97)
MONOCYTES # BLD AUTO: 0.4 X10E3/UL (ref 0.1–0.9)
MONOCYTES NFR BLD AUTO: 7 %
NEUTROPHILS # BLD AUTO: 3 X10E3/UL (ref 1.4–7)
NEUTROPHILS NFR BLD AUTO: 55 %
PLATELET # BLD AUTO: 311 X10E3/UL (ref 150–450)
POTASSIUM SERPL-SCNC: 4.3 MMOL/L (ref 3.5–5.2)
PROT SERPL-MCNC: 7 G/DL (ref 6–8.5)
RBC # BLD AUTO: 4.99 X10E6/UL (ref 3.77–5.28)
SODIUM SERPL-SCNC: 143 MMOL/L (ref 134–144)
TRIGL SERPL-MCNC: 108 MG/DL (ref 0–149)
TSH SERPL DL<=0.005 MIU/L-ACNC: 1.65 UIU/ML (ref 0.45–4.5)
VLDLC SERPL CALC-MCNC: 19 MG/DL (ref 5–40)
WBC # BLD AUTO: 5.3 X10E3/UL (ref 3.4–10.8)

## 2025-08-12 ENCOUNTER — HOSPITAL ENCOUNTER (OUTPATIENT)
Dept: BONE DENSITY | Facility: HOSPITAL | Age: 70
Discharge: HOME | End: 2025-08-12
Payer: MEDICARE

## 2025-08-12 ENCOUNTER — HOSPITAL ENCOUNTER (OUTPATIENT)
Dept: MAMMOGRAPHY | Facility: HOSPITAL | Age: 70
Discharge: HOME | End: 2025-08-12
Payer: MEDICARE

## 2025-08-12 DIAGNOSIS — Z12.31 SCREENING MAMMOGRAM FOR BREAST CANCER: ICD-10-CM

## 2025-08-12 DIAGNOSIS — Z78.0 ASYMPTOMATIC MENOPAUSAL STATE: ICD-10-CM

## 2025-08-12 PROCEDURE — 77067 SCR MAMMO BI INCL CAD: CPT

## 2025-08-12 PROCEDURE — 77063 BREAST TOMOSYNTHESIS BI: CPT

## 2025-08-12 PROCEDURE — 77080 DXA BONE DENSITY AXIAL: CPT

## 2025-08-28 DIAGNOSIS — F43.21 GRIEF: ICD-10-CM

## 2025-08-28 DIAGNOSIS — F41.9 ANXIETY: ICD-10-CM

## 2025-08-28 DIAGNOSIS — F51.04 CHRONIC INSOMNIA: ICD-10-CM

## 2025-08-28 RX ORDER — TRAZODONE HYDROCHLORIDE 50 MG/1
50 TABLET ORAL NIGHTLY PRN
Qty: 30 TABLET | Refills: 2 | Status: SHIPPED | OUTPATIENT
Start: 2025-08-28